# Patient Record
Sex: MALE | Race: WHITE | NOT HISPANIC OR LATINO | Employment: FULL TIME | URBAN - METROPOLITAN AREA
[De-identification: names, ages, dates, MRNs, and addresses within clinical notes are randomized per-mention and may not be internally consistent; named-entity substitution may affect disease eponyms.]

---

## 2023-06-20 ENCOUNTER — TRANSFERRED RECORDS (OUTPATIENT)
Dept: HEALTH INFORMATION MANAGEMENT | Facility: CLINIC | Age: 48
End: 2023-06-20
Payer: COMMERCIAL

## 2023-06-21 ENCOUNTER — APPOINTMENT (OUTPATIENT)
Dept: ULTRASOUND IMAGING | Facility: CLINIC | Age: 48
End: 2023-06-21
Attending: EMERGENCY MEDICINE
Payer: COMMERCIAL

## 2023-06-21 ENCOUNTER — HOSPITAL ENCOUNTER (INPATIENT)
Facility: CLINIC | Age: 48
LOS: 8 days | Discharge: OTHER INSTITUTION | End: 2023-06-29
Attending: EMERGENCY MEDICINE | Admitting: HOSPITALIST
Payer: COMMERCIAL

## 2023-06-21 ENCOUNTER — APPOINTMENT (OUTPATIENT)
Dept: CT IMAGING | Facility: CLINIC | Age: 48
End: 2023-06-21
Attending: EMERGENCY MEDICINE
Payer: COMMERCIAL

## 2023-06-21 DIAGNOSIS — F10.932 ALCOHOL WITHDRAWAL SYNDROME WITH PERCEPTUAL DISTURBANCE (H): ICD-10-CM

## 2023-06-21 DIAGNOSIS — E87.6 HYPOPOTASSEMIA: ICD-10-CM

## 2023-06-21 DIAGNOSIS — F10.232 ALCOHOL DEPENDENCE WITH WITHDRAWAL WITH PERCEPTUAL DISTURBANCE (H): ICD-10-CM

## 2023-06-21 DIAGNOSIS — K70.10 ALCOHOLIC HEPATITIS WITHOUT ASCITES (H): Primary | ICD-10-CM

## 2023-06-21 DIAGNOSIS — R53.1 ASTHENIA: ICD-10-CM

## 2023-06-21 DIAGNOSIS — E83.42 HYPOMAGNESEMIA: ICD-10-CM

## 2023-06-21 DIAGNOSIS — E87.6 HYPOKALEMIA: ICD-10-CM

## 2023-06-21 DIAGNOSIS — R53.1 GENERALIZED WEAKNESS: ICD-10-CM

## 2023-06-21 PROBLEM — K76.9 LIVER DISEASE: Status: ACTIVE | Noted: 2023-06-21

## 2023-06-21 PROBLEM — D69.6 THROMBOCYTOPENIA (H): Status: ACTIVE | Noted: 2023-06-21

## 2023-06-21 LAB
ALBUMIN SERPL BCG-MCNC: 3.7 G/DL (ref 3.5–5.2)
ALBUMIN UR-MCNC: NEGATIVE MG/DL
ALP SERPL-CCNC: 210 U/L (ref 40–129)
ALT SERPL W P-5'-P-CCNC: 44 U/L (ref 0–70)
ANION GAP SERPL CALCULATED.3IONS-SCNC: 15 MMOL/L (ref 7–15)
APAP SERPL-MCNC: <5 UG/ML (ref 10–30)
APPEARANCE UR: CLEAR
AST SERPL W P-5'-P-CCNC: 176 U/L (ref 0–45)
BASOPHILS # BLD AUTO: 0 10E3/UL (ref 0–0.2)
BASOPHILS NFR BLD AUTO: 0 %
BILIRUB SERPL-MCNC: 7.2 MG/DL
BILIRUB UR QL STRIP: NEGATIVE
BUN SERPL-MCNC: 7.2 MG/DL (ref 6–20)
CALCIUM SERPL-MCNC: 9 MG/DL (ref 8.6–10)
CHLORIDE SERPL-SCNC: 89 MMOL/L (ref 98–107)
COLOR UR AUTO: YELLOW
CREAT SERPL-MCNC: 0.59 MG/DL (ref 0.67–1.17)
DEPRECATED HCO3 PLAS-SCNC: 27 MMOL/L (ref 22–29)
EOSINOPHIL # BLD AUTO: 0 10E3/UL (ref 0–0.7)
EOSINOPHIL NFR BLD AUTO: 0 %
ERYTHROCYTE [DISTWIDTH] IN BLOOD BY AUTOMATED COUNT: 12.5 % (ref 10–15)
ETHANOL SERPL-MCNC: <0.01 G/DL
GFR SERPL CREATININE-BSD FRML MDRD: >90 ML/MIN/1.73M2
GGT SERPL-CCNC: 2961 U/L (ref 8–61)
GLUCOSE SERPL-MCNC: 118 MG/DL (ref 70–99)
GLUCOSE UR STRIP-MCNC: >499 MG/DL
HCT VFR BLD AUTO: 31.4 % (ref 40–53)
HGB BLD-MCNC: 11.2 G/DL (ref 13.3–17.7)
HGB UR QL STRIP: ABNORMAL
IMM GRANULOCYTES # BLD: 0.1 10E3/UL
IMM GRANULOCYTES NFR BLD: 1 %
INR PPP: 1.52 (ref 0.85–1.15)
IRON BINDING CAPACITY (ROCHE): 141 UG/DL (ref 240–430)
IRON SATN MFR SERPL: 38 % (ref 15–46)
IRON SERPL-MCNC: 54 UG/DL (ref 61–157)
KETONES UR STRIP-MCNC: NEGATIVE MG/DL
LEUKOCYTE ESTERASE UR QL STRIP: NEGATIVE
LIPASE SERPL-CCNC: 67 U/L (ref 13–60)
LYMPHOCYTES # BLD AUTO: 0.7 10E3/UL (ref 0.8–5.3)
LYMPHOCYTES NFR BLD AUTO: 7 %
MAGNESIUM SERPL-MCNC: 1.4 MG/DL (ref 1.7–2.3)
MAGNESIUM SERPL-MCNC: 2.2 MG/DL (ref 1.7–2.3)
MCH RBC QN AUTO: 32.9 PG (ref 26.5–33)
MCHC RBC AUTO-ENTMCNC: 35.7 G/DL (ref 31.5–36.5)
MCV RBC AUTO: 92 FL (ref 78–100)
MONOCYTES # BLD AUTO: 1.2 10E3/UL (ref 0–1.3)
MONOCYTES NFR BLD AUTO: 12 %
NEUTROPHILS # BLD AUTO: 8.6 10E3/UL (ref 1.6–8.3)
NEUTROPHILS NFR BLD AUTO: 80 %
NITRATE UR QL: NEGATIVE
NRBC # BLD AUTO: 0 10E3/UL
NRBC BLD AUTO-RTO: 0 /100
PH UR STRIP: 7 [PH] (ref 5–7)
PHOSPHATE SERPL-MCNC: 1.3 MG/DL (ref 2.5–4.5)
PLATELET # BLD AUTO: 96 10E3/UL (ref 150–450)
POTASSIUM SERPL-SCNC: 2.6 MMOL/L (ref 3.4–5.3)
POTASSIUM SERPL-SCNC: 2.7 MMOL/L (ref 3.4–5.3)
PROT SERPL-MCNC: 6.1 G/DL (ref 6.4–8.3)
RBC # BLD AUTO: 3.4 10E6/UL (ref 4.4–5.9)
RBC URINE: <1 /HPF
SODIUM SERPL-SCNC: 131 MMOL/L (ref 136–145)
SP GR UR STRIP: 1 (ref 1–1.03)
TRANSFERRIN SERPL-MCNC: 108 MG/DL (ref 200–360)
TSH SERPL DL<=0.005 MIU/L-ACNC: 3.13 UIU/ML (ref 0.3–4.2)
UROBILINOGEN UR STRIP-MCNC: NORMAL MG/DL
WBC # BLD AUTO: 10.6 10E3/UL (ref 4–11)
WBC URINE: <1 /HPF

## 2023-06-21 PROCEDURE — 84425 ASSAY OF VITAMIN B-1: CPT | Performed by: EMERGENCY MEDICINE

## 2023-06-21 PROCEDURE — 36415 COLL VENOUS BLD VENIPUNCTURE: CPT | Performed by: EMERGENCY MEDICINE

## 2023-06-21 PROCEDURE — 93010 ELECTROCARDIOGRAM REPORT: CPT | Performed by: EMERGENCY MEDICINE

## 2023-06-21 PROCEDURE — 80074 ACUTE HEPATITIS PANEL: CPT | Performed by: EMERGENCY MEDICINE

## 2023-06-21 PROCEDURE — 86015 ACTIN ANTIBODY EACH: CPT | Performed by: EMERGENCY MEDICINE

## 2023-06-21 PROCEDURE — 250N000011 HC RX IP 250 OP 636: Mod: JZ | Performed by: HOSPITALIST

## 2023-06-21 PROCEDURE — 120N000001 HC R&B MED SURG/OB

## 2023-06-21 PROCEDURE — 258N000003 HC RX IP 258 OP 636: Performed by: HOSPITALIST

## 2023-06-21 PROCEDURE — 93005 ELECTROCARDIOGRAM TRACING: CPT | Performed by: EMERGENCY MEDICINE

## 2023-06-21 PROCEDURE — 258N000003 HC RX IP 258 OP 636: Performed by: PHYSICIAN ASSISTANT

## 2023-06-21 PROCEDURE — 86706 HEP B SURFACE ANTIBODY: CPT | Performed by: EMERGENCY MEDICINE

## 2023-06-21 PROCEDURE — 83735 ASSAY OF MAGNESIUM: CPT | Performed by: EMERGENCY MEDICINE

## 2023-06-21 PROCEDURE — 81001 URINALYSIS AUTO W/SCOPE: CPT | Performed by: EMERGENCY MEDICINE

## 2023-06-21 PROCEDURE — 85025 COMPLETE CBC W/AUTO DIFF WBC: CPT | Performed by: EMERGENCY MEDICINE

## 2023-06-21 PROCEDURE — 80143 DRUG ASSAY ACETAMINOPHEN: CPT | Performed by: EMERGENCY MEDICINE

## 2023-06-21 PROCEDURE — 250N000013 HC RX MED GY IP 250 OP 250 PS 637: Performed by: PHYSICIAN ASSISTANT

## 2023-06-21 PROCEDURE — 250N000011 HC RX IP 250 OP 636: Performed by: PHYSICIAN ASSISTANT

## 2023-06-21 PROCEDURE — 250N000011 HC RX IP 250 OP 636: Performed by: EMERGENCY MEDICINE

## 2023-06-21 PROCEDURE — HZ2ZZZZ DETOXIFICATION SERVICES FOR SUBSTANCE ABUSE TREATMENT: ICD-10-PCS | Performed by: INTERNAL MEDICINE

## 2023-06-21 PROCEDURE — 82077 ASSAY SPEC XCP UR&BREATH IA: CPT | Performed by: EMERGENCY MEDICINE

## 2023-06-21 PROCEDURE — 99285 EMERGENCY DEPT VISIT HI MDM: CPT | Mod: 25 | Performed by: EMERGENCY MEDICINE

## 2023-06-21 PROCEDURE — 84443 ASSAY THYROID STIM HORMONE: CPT | Performed by: EMERGENCY MEDICINE

## 2023-06-21 PROCEDURE — 86038 ANTINUCLEAR ANTIBODIES: CPT | Performed by: EMERGENCY MEDICINE

## 2023-06-21 PROCEDURE — 84466 ASSAY OF TRANSFERRIN: CPT | Performed by: EMERGENCY MEDICINE

## 2023-06-21 PROCEDURE — 250N000013 HC RX MED GY IP 250 OP 250 PS 637: Performed by: HOSPITALIST

## 2023-06-21 PROCEDURE — 99223 1ST HOSP IP/OBS HIGH 75: CPT | Mod: AI | Performed by: PHYSICIAN ASSISTANT

## 2023-06-21 PROCEDURE — 250N000009 HC RX 250: Performed by: HOSPITALIST

## 2023-06-21 PROCEDURE — 83550 IRON BINDING TEST: CPT | Performed by: EMERGENCY MEDICINE

## 2023-06-21 PROCEDURE — 84100 ASSAY OF PHOSPHORUS: CPT | Performed by: HOSPITALIST

## 2023-06-21 PROCEDURE — 76705 ECHO EXAM OF ABDOMEN: CPT

## 2023-06-21 PROCEDURE — 85610 PROTHROMBIN TIME: CPT | Performed by: EMERGENCY MEDICINE

## 2023-06-21 PROCEDURE — 84132 ASSAY OF SERUM POTASSIUM: CPT | Performed by: PHYSICIAN ASSISTANT

## 2023-06-21 PROCEDURE — 258N000003 HC RX IP 258 OP 636: Performed by: EMERGENCY MEDICINE

## 2023-06-21 PROCEDURE — 82310 ASSAY OF CALCIUM: CPT | Performed by: EMERGENCY MEDICINE

## 2023-06-21 PROCEDURE — 83735 ASSAY OF MAGNESIUM: CPT | Mod: 91 | Performed by: PHYSICIAN ASSISTANT

## 2023-06-21 PROCEDURE — 82977 ASSAY OF GGT: CPT | Performed by: EMERGENCY MEDICINE

## 2023-06-21 PROCEDURE — 83690 ASSAY OF LIPASE: CPT | Performed by: EMERGENCY MEDICINE

## 2023-06-21 PROCEDURE — 70450 CT HEAD/BRAIN W/O DYE: CPT

## 2023-06-21 PROCEDURE — 36415 COLL VENOUS BLD VENIPUNCTURE: CPT | Performed by: PHYSICIAN ASSISTANT

## 2023-06-21 RX ORDER — GABAPENTIN 300 MG/1
900 CAPSULE ORAL EVERY 8 HOURS
Status: COMPLETED | OUTPATIENT
Start: 2023-06-21 | End: 2023-06-24

## 2023-06-21 RX ORDER — POTASSIUM CHLORIDE 7.45 MG/ML
10 INJECTION INTRAVENOUS ONCE
Status: COMPLETED | OUTPATIENT
Start: 2023-06-21 | End: 2023-06-21

## 2023-06-21 RX ORDER — DIAZEPAM 10 MG/2ML
5-10 INJECTION, SOLUTION INTRAMUSCULAR; INTRAVENOUS EVERY 30 MIN PRN
Status: DISCONTINUED | OUTPATIENT
Start: 2023-06-21 | End: 2023-06-25

## 2023-06-21 RX ORDER — GABAPENTIN 100 MG/1
100 CAPSULE ORAL EVERY 8 HOURS
Status: DISCONTINUED | OUTPATIENT
Start: 2023-06-28 | End: 2023-06-25

## 2023-06-21 RX ORDER — AMOXICILLIN 250 MG
1 CAPSULE ORAL 2 TIMES DAILY PRN
Status: DISCONTINUED | OUTPATIENT
Start: 2023-06-21 | End: 2023-06-29 | Stop reason: HOSPADM

## 2023-06-21 RX ORDER — MULTIPLE VITAMINS W/ MINERALS TAB 9MG-400MCG
1 TAB ORAL DAILY
Status: DISCONTINUED | OUTPATIENT
Start: 2023-06-21 | End: 2023-06-29 | Stop reason: HOSPADM

## 2023-06-21 RX ORDER — ONDANSETRON 4 MG/1
4 TABLET, ORALLY DISINTEGRATING ORAL EVERY 6 HOURS PRN
Status: DISCONTINUED | OUTPATIENT
Start: 2023-06-21 | End: 2023-06-29 | Stop reason: HOSPADM

## 2023-06-21 RX ORDER — POTASSIUM CHLORIDE 1.5 G/1.58G
40 POWDER, FOR SOLUTION ORAL ONCE
Status: COMPLETED | OUTPATIENT
Start: 2023-06-21 | End: 2023-06-21

## 2023-06-21 RX ORDER — DEXTROSE, SODIUM CHLORIDE, SODIUM LACTATE, POTASSIUM CHLORIDE, AND CALCIUM CHLORIDE 5; .6; .31; .03; .02 G/100ML; G/100ML; G/100ML; G/100ML; G/100ML
1000 INJECTION, SOLUTION INTRAVENOUS ONCE
Status: COMPLETED | OUTPATIENT
Start: 2023-06-21 | End: 2023-06-21

## 2023-06-21 RX ORDER — ACETAMINOPHEN 325 MG/1
650 TABLET ORAL EVERY 6 HOURS PRN
Status: DISCONTINUED | OUTPATIENT
Start: 2023-06-21 | End: 2023-06-29 | Stop reason: HOSPADM

## 2023-06-21 RX ORDER — DIAZEPAM 5 MG
10 TABLET ORAL EVERY 30 MIN PRN
Status: DISCONTINUED | OUTPATIENT
Start: 2023-06-21 | End: 2023-06-25

## 2023-06-21 RX ORDER — AMOXICILLIN 250 MG
2 CAPSULE ORAL 2 TIMES DAILY PRN
Status: DISCONTINUED | OUTPATIENT
Start: 2023-06-21 | End: 2023-06-29 | Stop reason: HOSPADM

## 2023-06-21 RX ORDER — CLONIDINE HYDROCHLORIDE 0.1 MG/1
0.1 TABLET ORAL EVERY 8 HOURS
Status: DISCONTINUED | OUTPATIENT
Start: 2023-06-21 | End: 2023-06-22

## 2023-06-21 RX ORDER — PROCHLORPERAZINE MALEATE 5 MG
10 TABLET ORAL EVERY 6 HOURS PRN
Status: DISCONTINUED | OUTPATIENT
Start: 2023-06-21 | End: 2023-06-29 | Stop reason: HOSPADM

## 2023-06-21 RX ORDER — HALOPERIDOL 5 MG/ML
2.5-5 INJECTION INTRAMUSCULAR EVERY 6 HOURS PRN
Status: DISCONTINUED | OUTPATIENT
Start: 2023-06-21 | End: 2023-06-25

## 2023-06-21 RX ORDER — OLANZAPINE 5 MG/1
5-10 TABLET, ORALLY DISINTEGRATING ORAL EVERY 6 HOURS PRN
Status: DISCONTINUED | OUTPATIENT
Start: 2023-06-21 | End: 2023-06-25

## 2023-06-21 RX ORDER — GABAPENTIN 300 MG/1
600 CAPSULE ORAL EVERY 8 HOURS
Status: DISCONTINUED | OUTPATIENT
Start: 2023-06-24 | End: 2023-06-25

## 2023-06-21 RX ORDER — FOLIC ACID 1 MG/1
1 TABLET ORAL DAILY
Status: DISCONTINUED | OUTPATIENT
Start: 2023-06-21 | End: 2023-06-29 | Stop reason: HOSPADM

## 2023-06-21 RX ORDER — IBUPROFEN 200 MG
200 TABLET ORAL EVERY 4 HOURS PRN
Status: ON HOLD | COMMUNITY
End: 2023-06-29

## 2023-06-21 RX ORDER — GABAPENTIN 300 MG/1
300 CAPSULE ORAL EVERY 8 HOURS
Status: DISCONTINUED | OUTPATIENT
Start: 2023-06-26 | End: 2023-06-25

## 2023-06-21 RX ORDER — GABAPENTIN 600 MG/1
1200 TABLET ORAL ONCE
Status: COMPLETED | OUTPATIENT
Start: 2023-06-21 | End: 2023-06-21

## 2023-06-21 RX ORDER — POTASSIUM CHLORIDE 7.45 MG/ML
10 INJECTION INTRAVENOUS
Status: COMPLETED | OUTPATIENT
Start: 2023-06-21 | End: 2023-06-21

## 2023-06-21 RX ORDER — LOPERAMIDE HYDROCHLORIDE 2 MG/1
2 TABLET ORAL 4 TIMES DAILY PRN
COMMUNITY

## 2023-06-21 RX ORDER — LORAZEPAM 2 MG/1
2 TABLET ORAL
COMMUNITY

## 2023-06-21 RX ORDER — MAGNESIUM SULFATE HEPTAHYDRATE 40 MG/ML
4 INJECTION, SOLUTION INTRAVENOUS ONCE
Status: COMPLETED | OUTPATIENT
Start: 2023-06-21 | End: 2023-06-21

## 2023-06-21 RX ORDER — DIAZEPAM 10 MG/2ML
10 INJECTION, SOLUTION INTRAMUSCULAR; INTRAVENOUS ONCE
Status: COMPLETED | OUTPATIENT
Start: 2023-06-21 | End: 2023-06-21

## 2023-06-21 RX ORDER — POTASSIUM CHLORIDE 1.5 G/1.58G
20 POWDER, FOR SOLUTION ORAL ONCE
Status: COMPLETED | OUTPATIENT
Start: 2023-06-22 | End: 2023-06-22

## 2023-06-21 RX ORDER — PROCHLORPERAZINE 25 MG
25 SUPPOSITORY, RECTAL RECTAL EVERY 12 HOURS PRN
Status: DISCONTINUED | OUTPATIENT
Start: 2023-06-21 | End: 2023-06-29 | Stop reason: HOSPADM

## 2023-06-21 RX ORDER — FLUMAZENIL 0.1 MG/ML
0.2 INJECTION, SOLUTION INTRAVENOUS
Status: DISCONTINUED | OUTPATIENT
Start: 2023-06-21 | End: 2023-06-29 | Stop reason: HOSPADM

## 2023-06-21 RX ORDER — LIDOCAINE 40 MG/G
CREAM TOPICAL
Status: DISCONTINUED | OUTPATIENT
Start: 2023-06-21 | End: 2023-06-29 | Stop reason: HOSPADM

## 2023-06-21 RX ORDER — ONDANSETRON 2 MG/ML
4 INJECTION INTRAMUSCULAR; INTRAVENOUS EVERY 6 HOURS PRN
Status: DISCONTINUED | OUTPATIENT
Start: 2023-06-21 | End: 2023-06-29 | Stop reason: HOSPADM

## 2023-06-21 RX ADMIN — SODIUM PHOSPHATE, MONOBASIC, MONOHYDRATE AND SODIUM PHOSPHATE, DIBASIC, ANHYDROUS 15 MMOL: 142; 276 INJECTION, SOLUTION INTRAVENOUS at 16:24

## 2023-06-21 RX ADMIN — FOLIC ACID 1 MG: 1 TABLET ORAL at 15:42

## 2023-06-21 RX ADMIN — THIAMINE HYDROCHLORIDE 500 MG: 100 INJECTION, SOLUTION INTRAMUSCULAR; INTRAVENOUS at 12:30

## 2023-06-21 RX ADMIN — POTASSIUM CHLORIDE 10 MEQ: 7.46 INJECTION, SOLUTION INTRAVENOUS at 17:28

## 2023-06-21 RX ADMIN — GABAPENTIN 900 MG: 300 CAPSULE ORAL at 22:53

## 2023-06-21 RX ADMIN — CLONIDINE HYDROCHLORIDE 0.1 MG: 0.1 TABLET ORAL at 22:54

## 2023-06-21 RX ADMIN — CLONIDINE HYDROCHLORIDE 0.1 MG: 0.1 TABLET ORAL at 15:42

## 2023-06-21 RX ADMIN — POTASSIUM CHLORIDE 10 MEQ: 7.46 INJECTION, SOLUTION INTRAVENOUS at 13:39

## 2023-06-21 RX ADMIN — THIAMINE HYDROCHLORIDE 500 MG: 100 INJECTION, SOLUTION INTRAMUSCULAR; INTRAVENOUS at 19:44

## 2023-06-21 RX ADMIN — SODIUM CHLORIDE, SODIUM LACTATE, POTASSIUM CHLORIDE, CALCIUM CHLORIDE AND DEXTROSE MONOHYDRATE 1000 ML: 5; 600; 310; 30; 20 INJECTION, SOLUTION INTRAVENOUS at 10:58

## 2023-06-21 RX ADMIN — MULTIPLE VITAMINS W/ MINERALS TAB 1 TABLET: TAB at 15:42

## 2023-06-21 RX ADMIN — POTASSIUM CHLORIDE 40 MEQ: 1.5 FOR SOLUTION ORAL at 22:53

## 2023-06-21 RX ADMIN — POTASSIUM CHLORIDE 10 MEQ: 7.46 INJECTION, SOLUTION INTRAVENOUS at 14:30

## 2023-06-21 RX ADMIN — POTASSIUM CHLORIDE 10 MEQ: 7.46 INJECTION, SOLUTION INTRAVENOUS at 18:25

## 2023-06-21 RX ADMIN — DIAZEPAM 10 MG: 5 INJECTION, SOLUTION INTRAMUSCULAR; INTRAVENOUS at 10:56

## 2023-06-21 RX ADMIN — POTASSIUM CHLORIDE 10 MEQ: 7.46 INJECTION, SOLUTION INTRAVENOUS at 16:28

## 2023-06-21 RX ADMIN — GABAPENTIN 1200 MG: 600 TABLET, FILM COATED ORAL at 15:42

## 2023-06-21 RX ADMIN — POTASSIUM CHLORIDE 10 MEQ: 7.46 INJECTION, SOLUTION INTRAVENOUS at 15:32

## 2023-06-21 RX ADMIN — MAGNESIUM SULFATE HEPTAHYDRATE 4 G: 40 INJECTION, SOLUTION INTRAVENOUS at 13:36

## 2023-06-21 ASSESSMENT — ACTIVITIES OF DAILY LIVING (ADL)
DOING_ERRANDS_INDEPENDENTLY_DIFFICULTY: NO
CONCENTRATING,_REMEMBERING_OR_MAKING_DECISIONS_DIFFICULTY: YES
VISION_MANAGEMENT: GLASSES
TOILETING_ISSUES: NO;YES
ADLS_ACUITY_SCORE: 31
WEAR_GLASSES_OR_BLIND: YES
ADLS_ACUITY_SCORE: 37
TOILETING_ASSISTANCE: TOILETING DIFFICULTY, ASSISTANCE 1 PERSON
ADLS_ACUITY_SCORE: 35
ADLS_ACUITY_SCORE: 37
WALKING_OR_CLIMBING_STAIRS_DIFFICULTY: OTHER (SEE COMMENTS)
DIFFICULTY_EATING/SWALLOWING: NO
TOILETING: 1-->ASSISTANCE (EQUIPMENT/PERSON) NEEDED
DRESSING/BATHING_DIFFICULTY: NO
TOILETING: 0-->NOT TOILET TRAINED OR ASSISTANCE NEEDED (DEVELOPMENTALLY APPROPRIATE)
CHANGE_IN_FUNCTIONAL_STATUS_SINCE_ONSET_OF_CURRENT_ILLNESS/INJURY: YES
FALL_HISTORY_WITHIN_LAST_SIX_MONTHS: YES
ADLS_ACUITY_SCORE: 35

## 2023-06-21 NOTE — ED TRIAGE NOTES
"Pt arrives vis [facility] from [facility]. Pt there for ETOH treatment, arrived there yesterday. Pt states last ETOH was Sunday. Pt being given Ativan \"I'm more twitchy with that I think\". Pt not able to move himself around very well. Pt fell yesterday, bruising noted to right upper arm and right side of torso.      Triage Assessment     Row Name 06/21/23 1030       Triage Assessment (Adult)    Airway WDL WDL       Respiratory WDL    Respiratory WDL WDL       Skin Circulation/Temperature WDL    Skin Circulation/Temperature WDL WDL       Cardiac WDL    Cardiac WDL WDL       Peripheral/Neurovascular WDL    Peripheral Neurovascular WDL WDL       Cognitive/Neuro/Behavioral WDL    Cognitive/Neuro/Behavioral WDL WDL              "

## 2023-06-21 NOTE — ED PROVIDER NOTES
History     Chief Complaint   Patient presents with     Alcohol Problem     Withdrawal     HPI  Guillermo Oden is a 48 year old male with a history of alcohol abuse and addiction who presents for weakness and unsteady gait.  I spoke on the phone with the physician at [facility] regarding this patient.  He arrived for treatment of his alcohol use disorder last evening but since that time the staff there have noticed he is jaundiced and has been significantly weak, having difficulty walking around, could not even get up and use the walker to get to the toilet.  Was incontinent of urine in his bed.  They are very concerned and wanted him to be evaluated.    The patient says that he has been a longstanding drinker, drinks alcohol daily, heavy amounts.  It has been increasing recently.  He has been feeling weak and rundown, having trouble walking.  He feels anxious and jittery.  He says he has been falling.  Denies headache, fever, chest pain, shortness of breath, abdominal pain, vomiting.    Allergies:  No Known Allergies    Problem List:    Patient Active Problem List    Diagnosis Date Noted     Hypokalemia 06/21/2023     Priority: Medium     Hypomagnesemia 06/21/2023     Priority: Medium     Generalized weakness 06/21/2023     Priority: Medium     Alcohol withdrawal syndrome with perceptual disturbance (H) 06/21/2023     Priority: Medium        Past Medical History:    No past medical history on file.    Past Surgical History:    No past surgical history on file.    Family History:    No family history on file.    Social History:  Marital Status:          Medications:    No current outpatient medications on file.        Review of Systems    Physical Exam   BP: 123/86  Pulse: 106  Temp: 98.4  F (36.9  C)  Resp: 18  Height: 182.9 cm (6')  Weight: 68 kg (150 lb)  SpO2: 97 %      Physical Exam  /85   Pulse 86   Temp 98.4  F (36.9  C) (Oral)   Resp 12   Ht 1.829 m (6')   Wt 68 kg (150 lb)   SpO2 96%   BMI 20.34  kg/m     GENERAL: Alert, cooperative, moderate distress, jaundiced, tremulous  HEAD: No scalp laceration, hematoma, or abrasion.  No tenderness.  EYES: Conjunctivae clear, EOM intact. PERLL, Pupils are 2 mm.  HEENT:  Normal external ears.  No nasal discharge or evidence of septal hematoma. No facial instability or asymmetry. No evidence of intraoral trauma.  Intact bite without malalignment.  NECK: Full range of motion  CHEST:  No crepitus or tenderness with AP or lateral compression  CARDIOVASCULAR : Nml rate, distal pulses are normal and symmetric  LUNGS: No respiratory distress.  GI: Soft, ND, NT.   PELVIS: No crepitus or tenderness with AP or lateral compression  BACK: No step-offs palpated, no tenderness to palpation of thoracic or lumbar spine.  EXTREMITIES: No obvious deformities, no tenderness to palpation of the bilateral upper extremities or bilateral lower extremities over the long bones or major joints  NEUROLOGICAL : GCS= 15.  Awake, alert, and oriented x 3.  Cranial nerves II-XII grossly intact. Normal muscle strength and tone, sensation to light touch grossly intact in all extremities.  No focal deficits.   SKIN : Normal color, no jaundice or rash.  Multiple areas of ecchymosis throughout the bilateral upper extremities      ED Course                 Procedures              EKG Interpretation:      Interpreted by Dagoberto Awad MD  Time reviewed: 1050  Symptoms at time of EKG: Weakness   Rhythm: sinus   Rate: 108  Axis: normal  Ectopy: none  Conduction: normal  ST Segments/ T Waves: T wave flattening  Q Waves: none  Comparison to prior: No old EKG available    Clinical Impression: non-specific EKG    Critical Care time:  none               Results for orders placed or performed during the hospital encounter of 06/21/23 (from the past 24 hour(s))   CBC with Platelets & Differential    Narrative    The following orders were created for panel order CBC with Platelets & Differential.  Procedure                                Abnormality         Status                     ---------                               -----------         ------                     CBC with platelets and d...[[id number]]  Abnormal            Final result                 Please view results for these tests on the individual orders.   Comprehensive metabolic panel   Result Value Ref Range    Sodium 131 (L) 136 - 145 mmol/L    Potassium 2.7 (L) 3.4 - 5.3 mmol/L    Chloride 89 (L) 98 - 107 mmol/L    Carbon Dioxide (CO2) 27 22 - 29 mmol/L    Anion Gap 15 7 - 15 mmol/L    Urea Nitrogen 7.2 6.0 - 20.0 mg/dL    Creatinine 0.59 (L) 0.67 - 1.17 mg/dL    Calcium 9.0 8.6 - 10.0 mg/dL    Glucose 118 (H) 70 - 99 mg/dL    Alkaline Phosphatase 210 (H) 40 - 129 U/L     (H) 0 - 45 U/L    ALT 44 0 - 70 U/L    Protein Total 6.1 (L) 6.4 - 8.3 g/dL    Albumin 3.7 3.5 - 5.2 g/dL    Bilirubin Total 7.2 (H) <=1.2 mg/dL    GFR Estimate >90 >60 mL/min/1.73m2   Lipase   Result Value Ref Range    Lipase 67 (H) 13 - 60 U/L   TSH with free T4 reflex   Result Value Ref Range    TSH 3.13 0.30 - 4.20 uIU/mL   Magnesium   Result Value Ref Range    Magnesium 1.4 (L) 1.7 - 2.3 mg/dL   Alcohol level blood   Result Value Ref Range    Alcohol ethyl <0.01 <=0.01 g/dL   CBC with platelets and differential   Result Value Ref Range    WBC Count 10.6 4.0 - 11.0 10e3/uL    RBC Count 3.40 (L) 4.40 - 5.90 10e6/uL    Hemoglobin 11.2 (L) 13.3 - 17.7 g/dL    Hematocrit 31.4 (L) 40.0 - 53.0 %    MCV 92 78 - 100 fL    MCH 32.9 26.5 - 33.0 pg    MCHC 35.7 31.5 - 36.5 g/dL    RDW 12.5 10.0 - 15.0 %    Platelet Count 96 (L) 150 - 450 10e3/uL    % Neutrophils 80 %    % Lymphocytes 7 %    % Monocytes 12 %    % Eosinophils 0 %    % Basophils 0 %    % Immature Granulocytes 1 %    NRBCs per 100 WBC 0 <1 /100    Absolute Neutrophils 8.6 (H) 1.6 - 8.3 10e3/uL    Absolute Lymphocytes 0.7 (L) 0.8 - 5.3 10e3/uL    Absolute Monocytes 1.2 0.0 - 1.3 10e3/uL    Absolute Eosinophils 0.0 0.0 - 0.7  10e3/uL    Absolute Basophils 0.0 0.0 - 0.2 10e3/uL    Absolute Immature Granulocytes 0.1 <=0.4 10e3/uL    Absolute NRBCs 0.0 10e3/uL   Iron & Iron Binding Capacity   Result Value Ref Range    Iron 54 (L) 61 - 157 ug/dL    Iron Binding Capacity 141 (L) 240 - 430 ug/dL    Iron Sat Index 38 15 - 46 %   Phosphorus   Result Value Ref Range    Phosphorus 1.3 (L) 2.5 - 4.5 mg/dL   CT Head w/o Contrast    Narrative    CT SCAN OF THE HEAD WITHOUT CONTRAST   6/21/2023 11:31 AM     HISTORY: Altered mental status, unsteady gait.    TECHNIQUE:  Axial images of the head and coronal reformations without  IV contrast material. Radiation dose for this scan was reduced using  automated exposure control, adjustment of the mA and/or kV according  to patient size, or iterative reconstruction technique.    COMPARISON: None.    FINDINGS: No acute intracranial hemorrhage. No mass effect or midline  shift. Ventricular size is within normal limits without evidence of  hydrocephalus. Probable arachnoid cyst in the anterior left middle  cranial fossa. Gray-white matter differentiation appears intact.    Mucosal thickening in the left maxillary sinus which has an air-fluid  layer. The bony calvarium and bones of the skull base appear intact.       Impression    IMPRESSION:     1. No evidence of acute intracranial hemorrhage, mass, or herniation.  2. Mucosal thickening in the left maxillary sinus which contains an  air-fluid layer. This could represent acute sinusitis in the  appropriate clinical setting.      KENISHA BRIAN MD         SYSTEM ID:  [id number]   Urine Macroscopic with reflex to Microscopic   Result Value Ref Range    Color Urine Yellow Colorless, Straw, Light Yellow, Yellow    Appearance Urine Clear Clear    Glucose Urine >499 (A) Negative mg/dL    Bilirubin Urine Negative Negative    Ketones Urine Negative Negative mg/dL    Specific Gravity Urine 1.000 (L) 1.003 - 1.035    Blood Urine Small (A) Negative    pH Urine 7.0 5.0 - 7.0     Protein Albumin Urine Negative Negative mg/dL    Urobilinogen Urine Normal Normal, 2.0 mg/dL    Nitrite Urine Negative Negative    Leukocyte Esterase Urine Negative Negative    RBC Urine <1 <=2 /HPF    WBC Urine <1 <=5 /HPF   Acetaminophen level   Result Value Ref Range    Acetaminophen <5.0 (L) 10.0 - 30.0 ug/mL   INR   Result Value Ref Range    INR 1.52 (H) 0.85 - 1.15   Abdomen US, limited (RUQ only)    Narrative    US ABDOMEN LIMITED 6/21/2023 12:58 PM    CLINICAL HISTORY: liver failure, liver failure, etoh abuse  TECHNIQUE: Limited abdominal ultrasound.    COMPARISON: None.    FINDINGS:    GALLBLADDER: There is a 2.1 x 5.4 cm lobulated solid structure in the  dependent portion of the gallbladder lumen. No posterior acoustic  shadowing. No shadowing gallstones. No wall thickening or edema.  Negative sonographic Davidson's sign.    BILE DUCTS: There is no biliary dilatation. The common duct measures 3  mm.    LIVER: Diffusely coarse in echotexture. Mildly enlarged. Normal  surface contour. No discrete focal lesion.    RIGHT KIDNEY: No hydronephrosis.    PANCREAS: The visualized portions of the pancreas are normal.    Trace ascites.      Impression    IMPRESSION:  1.  Mildly enlarged fatty liver.  2.  Presumed tumefactive sludge within the gallbladder lumen.  Recommend short-term ultrasound follow-up in 8-12 weeks. No  cholelithiasis.  3.  Trace ascites.    FLACA ORTIZ MD         SYSTEM ID:  [id number]       Medications   thiamine (B-1) 500 mg in sodium chloride 0.9 % 50 mL intermittent infusion (0 mg Intravenous Stopped 6/21/23 1300)     Followed by   thiamine (B-1) 250 mg in sodium chloride 0.9 % 50 mL intermittent infusion (has no administration in time range)     Followed by   thiamine (B-1) tablet 100 mg (has no administration in time range)   potassium chloride 10 mEq in 100 mL sterile water infusion (has no administration in time range)   magnesium sulfate 4 g in 100 mL sterile water intermittent  infusion (has no administration in time range)   dextrose 5% in lactated ringers infusion (0 mLs Intravenous Stopped 6/21/23 1228)   diazepam (VALIUM) injection 10 mg (10 mg Intravenous $Given 6/21/23 1056)       Assessments & Plan (with Medical Decision Making)   48-year-old male who presents from his living with altered mental status, weakness, unsteady gait.  He is tachycardic and tremulous.  He is given IV fluids as well as IV thiamine in case there is a component of Warnicke's encephalopathy causing symptoms.  EKG is sinus rhythm with tachycardia but no signs of ischemia.  He does have T wave flattening and his potassium is 2.7, likely contributing to these EKG changes.  His magnesium is also low again consistent with his longstanding alcohol use.  He is replaced with both potassium and magnesium.  Ultrasound the gallbladder and liver obtained, images interpreted independently, also read the radiology read, trace ascites and mildly enlarged fatty liver.  INR is mildly elevated at 1.52.  His LFTs are mildly elevated.  His ethanol level 0.  The patient has significant derangements of his electrolytes and is weak and is unsafe to discharge back to detox center.  He is given IV diazepam for withdrawal.  He will be admitted to the medicine service here for further care.  I discussed the case with the on-call hospitalist, Dr. Chandler.  We discussed future management of the patient and full admission status.    I have reviewed the nursing notes.    I have reviewed the findings, diagnosis, plan and need for follow up with the patient.           Medical Decision Making  The patient's presentation was of high complexity (a chronic illness severe exacerbation, progression, or side effect of treatment).    The patient's evaluation involved:  an assessment requiring an independent historian (see separate area of note for details)  ordering and/or review of 3+ test(s) in this encounter (see separate area of note for  details)  independent interpretation of testing performed by another health professional (see separate area of note for details)    The patient's management necessitated high risk (a parenteral controlled substance) and high risk (a decision regarding hospitalization).        New Prescriptions    No medications on file       Final diagnoses:   Alcohol withdrawal syndrome with perceptual disturbance (H)   Hypomagnesemia   Hypokalemia   Generalized weakness       6/21/2023   [facility] EMERGENCY DEPT     Dagoberto Awad MD  06/21/23 5713

## 2023-06-21 NOTE — ED NOTES
[facility]   Admission Handoff    The patient is Guillermo Oden, 48 year old who arrived in the ED by AMBULANCE from [facility] with a complaint of Alcohol Problem and Withdrawal  . The patient's current symptoms are new and during this time the symptoms have increased. In the ED, patient was diagnosed with   Final diagnoses:   Alcohol withdrawal syndrome with perceptual disturbance (H)   Hypomagnesemia   Hypokalemia   Generalized weakness         Needed?: No    Allergies:  No Known Allergies    Past Medical Hx: No past medical history on file.    Initial vitals were: BP: 123/86  Pulse: 106  Temp: 98.4  F (36.9  C)  Resp: 18  Height: 182.9 cm (6')  Weight: 68 kg (150 lb)  SpO2: 97 %   Recent vital Signs: /85   Pulse 86   Temp 98.4  F (36.9  C) (Oral)   Resp 12   Ht 1.829 m (6')   Wt 68 kg (150 lb)   SpO2 96%   BMI 20.34 kg/m      Elimination Status: Continent: wears briefs     Activity Level: SBA    Fall Status: Reason for falls risk:  Mobility, Reason for falls risk: Elimination, and Reason for falls risk: Cognition  nonskid shoes/slippers when out of bed, arm band in place, patient and family education, assistive device/personal items within reach, activity supervised, and room door open    Baseline Mental status: other pt slightly confused d/t ETOH withdrawal  Current Mental Status changes: other      Infection present or suspected this encounter: no  Sepsis suspected: No    Isolation type: none    Bariatric equipment needed?: No    In the ED these meds were given:   Medications   thiamine (B-1) 500 mg in sodium chloride 0.9 % 50 mL intermittent infusion (0 mg Intravenous Stopped 6/21/23 1300)     Followed by   thiamine (B-1) 250 mg in sodium chloride 0.9 % 50 mL intermittent infusion (has no administration in time range)     Followed by   thiamine (B-1) tablet 100 mg (has no administration in time range)   potassium chloride 10 mEq in 100 mL sterile water infusion  (has no administration in time range)   magnesium sulfate 4 g in 100 mL sterile water intermittent infusion (has no administration in time range)   dextrose 5% in lactated ringers infusion (0 mLs Intravenous Stopped 6/21/23 1228)   diazepam (VALIUM) injection 10 mg (10 mg Intravenous $Given 6/21/23 1056)       Drips running?  Yes    Home pump  No    Current LDAs: Peripheral IV: Site left AC; Gauge 18  IV push only, no IV fluids     Results:   Labs/Imaging  Ordered and Resulted from Time of ED Arrival Up to the Time of Departure from the ED  Results for orders placed or performed during the hospital encounter of 06/21/23 (from the past 24 hour(s))   CBC with Platelets & Differential    Narrative    The following orders were created for panel order CBC with Platelets & Differential.  Procedure                               Abnormality         Status                     ---------                               -----------         ------                     CBC with platelets and d...[[id number]]  Abnormal            Final result                 Please view results for these tests on the individual orders.   Comprehensive metabolic panel   Result Value Ref Range    Sodium 131 (L) 136 - 145 mmol/L    Potassium 2.7 (L) 3.4 - 5.3 mmol/L    Chloride 89 (L) 98 - 107 mmol/L    Carbon Dioxide (CO2) 27 22 - 29 mmol/L    Anion Gap 15 7 - 15 mmol/L    Urea Nitrogen 7.2 6.0 - 20.0 mg/dL    Creatinine 0.59 (L) 0.67 - 1.17 mg/dL    Calcium 9.0 8.6 - 10.0 mg/dL    Glucose 118 (H) 70 - 99 mg/dL    Alkaline Phosphatase 210 (H) 40 - 129 U/L     (H) 0 - 45 U/L    ALT 44 0 - 70 U/L    Protein Total 6.1 (L) 6.4 - 8.3 g/dL    Albumin 3.7 3.5 - 5.2 g/dL    Bilirubin Total 7.2 (H) <=1.2 mg/dL    GFR Estimate >90 >60 mL/min/1.73m2   Lipase   Result Value Ref Range    Lipase 67 (H) 13 - 60 U/L   TSH with free T4 reflex   Result Value Ref Range    TSH 3.13 0.30 - 4.20 uIU/mL   Magnesium   Result Value Ref Range    Magnesium 1.4 (L) 1.7 -  2.3 mg/dL   Alcohol level blood   Result Value Ref Range    Alcohol ethyl <0.01 <=0.01 g/dL   CBC with platelets and differential   Result Value Ref Range    WBC Count 10.6 4.0 - 11.0 10e3/uL    RBC Count 3.40 (L) 4.40 - 5.90 10e6/uL    Hemoglobin 11.2 (L) 13.3 - 17.7 g/dL    Hematocrit 31.4 (L) 40.0 - 53.0 %    MCV 92 78 - 100 fL    MCH 32.9 26.5 - 33.0 pg    MCHC 35.7 31.5 - 36.5 g/dL    RDW 12.5 10.0 - 15.0 %    Platelet Count 96 (L) 150 - 450 10e3/uL    % Neutrophils 80 %    % Lymphocytes 7 %    % Monocytes 12 %    % Eosinophils 0 %    % Basophils 0 %    % Immature Granulocytes 1 %    NRBCs per 100 WBC 0 <1 /100    Absolute Neutrophils 8.6 (H) 1.6 - 8.3 10e3/uL    Absolute Lymphocytes 0.7 (L) 0.8 - 5.3 10e3/uL    Absolute Monocytes 1.2 0.0 - 1.3 10e3/uL    Absolute Eosinophils 0.0 0.0 - 0.7 10e3/uL    Absolute Basophils 0.0 0.0 - 0.2 10e3/uL    Absolute Immature Granulocytes 0.1 <=0.4 10e3/uL    Absolute NRBCs 0.0 10e3/uL   Iron & Iron Binding Capacity   Result Value Ref Range    Iron 54 (L) 61 - 157 ug/dL    Iron Binding Capacity 141 (L) 240 - 430 ug/dL    Iron Sat Index 38 15 - 46 %   CT Head w/o Contrast    Narrative    CT SCAN OF THE HEAD WITHOUT CONTRAST   6/21/2023 11:31 AM     HISTORY: Altered mental status, unsteady gait.    TECHNIQUE:  Axial images of the head and coronal reformations without  IV contrast material. Radiation dose for this scan was reduced using  automated exposure control, adjustment of the mA and/or kV according  to patient size, or iterative reconstruction technique.    COMPARISON: None.    FINDINGS: No acute intracranial hemorrhage. No mass effect or midline  shift. Ventricular size is within normal limits without evidence of  hydrocephalus. Probable arachnoid cyst in the anterior left middle  cranial fossa. Gray-white matter differentiation appears intact.    Mucosal thickening in the left maxillary sinus which has an air-fluid  layer. The bony calvarium and bones of the skull base  appear intact.       Impression    IMPRESSION:     1. No evidence of acute intracranial hemorrhage, mass, or herniation.  2. Mucosal thickening in the left maxillary sinus which contains an  air-fluid layer. This could represent acute sinusitis in the  appropriate clinical setting.      KENISHA BRIAN MD         SYSTEM ID:  [id number]   Urine Macroscopic with reflex to Microscopic   Result Value Ref Range    Color Urine Yellow Colorless, Straw, Light Yellow, Yellow    Appearance Urine Clear Clear    Glucose Urine >499 (A) Negative mg/dL    Bilirubin Urine Negative Negative    Ketones Urine Negative Negative mg/dL    Specific Gravity Urine 1.000 (L) 1.003 - 1.035    Blood Urine Small (A) Negative    pH Urine 7.0 5.0 - 7.0    Protein Albumin Urine Negative Negative mg/dL    Urobilinogen Urine Normal Normal, 2.0 mg/dL    Nitrite Urine Negative Negative    Leukocyte Esterase Urine Negative Negative    RBC Urine <1 <=2 /HPF    WBC Urine <1 <=5 /HPF   Abdomen US, limited (RUQ only)    Narrative    US ABDOMEN LIMITED 6/21/2023 12:58 PM    CLINICAL HISTORY: liver failure, liver failure, etoh abuse  TECHNIQUE: Limited abdominal ultrasound.    COMPARISON: None.    FINDINGS:    GALLBLADDER: There is a 2.1 x 5.4 cm lobulated solid structure in the  dependent portion of the gallbladder lumen. No posterior acoustic  shadowing. No shadowing gallstones. No wall thickening or edema.  Negative sonographic Davidson's sign.    BILE DUCTS: There is no biliary dilatation. The common duct measures 3  mm.    LIVER: Diffusely coarse in echotexture. Mildly enlarged. Normal  surface contour. No discrete focal lesion.    RIGHT KIDNEY: No hydronephrosis.    PANCREAS: The visualized portions of the pancreas are normal.    Trace ascites.      Impression    IMPRESSION:  1.  Mildly enlarged fatty liver.  2.  Presumed tumefactive sludge within the gallbladder lumen.  Recommend short-term ultrasound follow-up in 8-12 weeks. No  cholelithiasis.  3.  Trace  ascites.    FLACA ORTIZ MD         SYSTEM ID:  [id number]       For the majority of the shift this patient's behavior was Green     Cardiac Rhythm: Tachycardia  Pt needs tele? Yes  Skin/wound Issues:  pt has bruised noted to both arms and right torso from recent falls.     Code Status: Full Code    Pain control: pt had none    Nausea control: pt had none    Abnormal labs/tests/findings requiring intervention: see chart    Patient tested for COVID 19 prior to admission: NO     OBS brochure/video discussed/provided to patient/family: N/A     Family present during ED course? No     Family Comments/Social Situation comments: OK to give mother, Cleopatra [phone], updates. Pt is from [facility], has no family in the area.     Tasks needing completion:  labs as ordered. K+ and Mg protocols and replacement doses to be completed, see MAR.     Maribeth Mckay RN

## 2023-06-21 NOTE — MEDICATION SCRIBE - ADMISSION MEDICATION HISTORY
Medication Scribe Admission Medication History    Admission medication history is complete. The information provided in this note is only as accurate as the sources available at the time of the update.    Medication reconciliation/reorder completed by provider prior to medication history? No    Information Source(s): Patient and Facility (TCU/NH/) medication list/MAR via phone    Pertinent Information: pt arrived at [facility] yesterday    Changes made to PTA medication list:    Added: advil, thiamine, ativan, magnesium    Deleted: None    Changed: None    Medication Affordability:  Not including over the counter (OTC) medications, was there a time in the past 3 months when you did not take your medications as prescribed because of cost?: Unable to Assess (pt is intoxicated)    Allergies reviewed with patient and updates made in EHR: yes    Medication History Completed By: Marisol Grayson 6/21/2023 6:38 PM    Prior to Admission medications    Medication Sig Last Dose Taking? Auth Provider Long Term End Date   ibuprofen (ADVIL/MOTRIN) 200 MG tablet Take 200 mg by mouth every 4 hours as needed for pain Past Week at unknown Yes Reported, Patient     loperamide (IMODIUM A-D) 2 MG tablet Take 2 mg by mouth 4 times daily as needed for diarrhea Unknown at unknown Yes Reported, Patient     LORazepam (ATIVAN) 2 MG tablet Take 2 mg by mouth Per [facility] 6/21/2023 at 0545 Yes Reported, Patient     magnesium chloride 535 (64 Mg) MG TBEC CR tablet Take 535 mg by mouth Per [facility] 6/20/2023 at 2100 Yes Reported, Patient     Thiamine HCl (THIAMINE PO) Take 1,000 mg by mouth Per [facility] 6/20/2023 at 2100 Yes Reported, Patient

## 2023-06-21 NOTE — PROGRESS NOTES
Reviewing chart due to high probability of admit to Med/Surg unit. Pt has not yet been accepted by Provider.  Victor Hugo Covarrubias RN on 6/21/2023 at 12:58 PM    Pt has been accepted by Dr. Chandler.  Victor Hugo Covarrubias RN on 6/21/2023 at 1:58 PM

## 2023-06-21 NOTE — PLAN OF CARE
Reason for Admission:  Alcohol withdrawal     Activity: patient has not been out of bed d/t unsteadiness and weakness but has been repositioning in the bed with 1 assist     Neuro: alert and oriented.  Currently on CIWA's last scored a 4 and next CIWA assessment is at 2000.      Cardiac: tachycardic    GI/: incontinent of urine at times but has urinal at bedside that he will use     Skin:  bruising all over right side of his body and abrasions/excoriations scattered all over his body     Diet: Regular diet     Lines/Drains: 2 Iv's in the left and right AC     Vitals: /78   Pulse 97   Temp 97.3  F (36.3  C) (Oral)   Resp 18   Ht 1.829 m (6')   Wt 64.9 kg (143 lb 1.3 oz)   SpO2 96%   BMI 19.40 kg/m      Labs: On Mag, Potassium and Phosphorous protocol.  Magnesium was 1.4 and was replaced and lab recheck is scheduled for 1900.  Potassium is currently being replaced and lab recheck is scheduled for 2200.  Phosphorous is currently being replaced and lab recheck is scheduled for 0000.     Pain: denying pain     Plan: will return back to [facility] when medically ready

## 2023-06-21 NOTE — H&P
"[facility]    History and Physical - Hospitalist Service       Date of Admission:  6/21/2023    Assessment & Plan      Guillermo Oden is a 48 year old male admitted on 6/21/2023. He presented to the emergency department from [facility] for evaluation of alcohol withdrawal for which he is being admitted for further evaluation and treatment.    Alcohol withdrawal syndrome with perceptual disturbance   Was sent to the emergency department from [facility] for alcohol withdrawal that was not well controlled there. Drinks about 850 mL vodka daily, last drink on 6/18/23. Has never been through treatment before but has withdrawn, no history of seizures.   - Admit on CIWA with prn Valium, scheduled clonidine, and scheduled gabapentin taper  - Care Transitions consult to help with discharge back to [facility]    Liver disease, likely due to alcohol   Presented with alcohol pattern LFTs (ALT 44, ), elevated total bilirubin (7.2), elevated INR (1.52)Hepatomegaly and scleral icterus noted on exam. Abdominal ultrasound demonstrates \"1.  Mildly enlarged fatty liver. 2.  Presumed tumefactive sludge within the gallbladder lumen. Recommend short-term ultrasound follow-up in 8-12 weeks. No cholelithiasis. 3.  Trace ascites.\" Presumably has alcoholic liver disease, although cannot exclude other etiologies.   - Trend CMP daily   - Work-up pending: viral hepatitis panel, Anti Nuclear antibody IGG, GGT, F actin EIA, vitamin V1, transferrin    Hypokalemia / Hypomagnesemia / hypophosphatemia   Admit K = 2.7 / Mg = 1.4 / Phos = 1.3. Likely due to poor intake other than alcohol, is at risk for refeeding syndrome.  - K / Mg / Phos replacement protocols    Thrombocytopenia   Admit platelets 96, likely due to chronic alcohol use.   - Monitor CBC occasionally    Normocytic anemia  Iron studies show low iron (54) and low IBC, (141), normal iron saturation index (38), but ferritin and " "transferrin pending. Possible anemia of chronic disease?  - Ferritin, transferrin pending    Generalized weakness  Patient weak and unsteady secondary to withdrawal, expect to improve as patient completes withdrawal process.  - May need PT / OT, will defer for now             Diet: Combination Diet Regular Diet Adult  DVT Prophylaxis: Pneumatic Compression Devices  Ferro Catheter: Not present  Lines: None     Cardiac Monitoring: None  Code Status: Full Code  - discussed at time of admission     Clinically Significant Risk Factors Present on Admission        # Hypokalemia: Lowest K = 2.7 mmol/L in last 2 days, will replace as needed     # Hypomagnesemia: Lowest Mg = 1.4 mg/dL in last 2 days, will replace as needed    # Coagulation Defect: INR = 1.52 (Ref range: 0.85 - 1.15) and/or PTT = N/A, will monitor for bleeding  # Thrombocytopenia: Lowest platelets = 96 in last 2 days, will monitor for bleeding                 Disposition Plan      Expected Discharge Date: 06/23/2023                The patient's care was discussed with the Attending Physician, Dr. Denver Saeed and Patient.    Deneen Guaman PA-C  Hospitalist Service  [facility]  Securely message with [employer] (more info)  Text page via [facility] Paging/Directory     ______________________________________________________________________    Chief Complaint   \"I'm withdrawing.\"    History is obtained from the patient, review of EMR (minimal information available), and emergency department sign out from Dr. Kirill Awad.    History of Present Illness   Guillermo Oden is a 48 year old male who presented to the emergency department from [facility] due to alcohol withdrawal.    Patient lives in [place] but came to [place] to enroll in treatment for alcohol dependence at [facility].   He has gone through withdrawal in the past, but has never attended treatment before.   He drinks about 850 ml vodka daily, " "last drink 4 days ago on 8/16.   He was being treatment for withdrawal at [facility], but patient remained extremely tremulous despite oral treatment there, so he was sent to the emergency department, where ongoing treatment for withdrawal was initiated.    Review of systems   Poor recent appetite, had COVID a while ago and ever since then he has lost his taste so nothing tastes good.  He has lost about 20 pounds since then (used to weigh around 165).   He has occasional diarrhea, nothing recently.   He feels weak and unsteady on his feet.  He has some bruises on his arms, says \"I was working on some things\" when asked how he got the bruises, but does not further elaborate.  The remainder review of systems is negative.       Past Medical History    No past medical history on file.    Past Surgical History   No past surgical history on file.    Prior to Admission Medications   None        Review of Systems    The 10 point Review of Systems is negative other than noted in the HPI or here.      Physical Exam   Vital Signs: Temp: 97.3  F (36.3  C) Temp src: Oral BP: 116/78 Pulse: 97   Resp: 18 SpO2: 96 % O2 Device: None (Room air)    Weight: 143 lbs 1.26 oz    Constitutional: Alert but responses to questions and commands are somewhat delayed. Oriented, cooperative, and appropriate. No apparent distress, appears nontoxic. Speaking in full coherent sentences, although slow and delayed speech.     Eyes: Eyes are clear, pupils are reactive. Scleral icterus present. Extraocular movements intact.     HEENT: Oropharynx is clear and moist, no lesions. Tongue fasciculations present. Normocephalic, no evidence of cranial trauma.        Cardiovascular: Regular rhythm and rate, normal S1 and S2. No murmur, rubs, or gallops. Peripheral pulses intact bilaterally. No lower extremity edema.    Respiratory: Lung sounds are clear to auscultation bilaterally without wheezes, rhonchi, or crackles.    GI: Firm but non-distended. Non-tender, " no rebound or guarding. Hepatosplenomegaly noted. Normal bowel sounds.     Musculoskeletal: Without obvious deformity, normal range of motion. Normal muscle bulk and tone. Distal CMS intact.      Skin: Warm and dry, no rashes. Scattered ecchymoses present on arms. No mottling of skin.      Neurologic: Patient moves all extremities. Gross strength and sensation are equal bilaterally. Is shaky and tremulous, tongue fasciculations present.     Genitourinary: Deferred      Medical Decision Making       60 MINUTES SPENT BY ME on the date of service doing chart review, history, exam, documentation & further activities per the note.  MANAGEMENT DISCUSSED with the following over the past 24 hours: Dr. Denver Saeed   Tests ORDERED & REVIEWED in the past 24 hours:  - CMP  - CBC  - LFTs  - Magnesium  - Phosphorus  Tests personally interpreted in the past 24 hours:  - EKG tracing showing sinus tachycardia      Data     I have personally reviewed the following data over the past 24 hrs:    10.6  \   11.2 (L)   / 96 (L)     131 (L) 89 (L) 7.2 /  118 (H)   2.7 (L) 27 0.59 (L) \       ALT: 44 AST: 176 (H) AP: 210 (H) TBILI: 7.2 (H)   ALB: 3.7 TOT PROTEIN: 6.1 (L) LIPASE: 67 (H)       TSH: 3.13 T4: N/A A1C: N/A       INR:  1.52 (H) PTT:  N/A   D-dimer:  N/A Fibrinogen:  N/A       Imaging results reviewed over the past 24 hrs:   Recent Results (from the past 24 hour(s))   CT Head w/o Contrast    Narrative    CT SCAN OF THE HEAD WITHOUT CONTRAST   6/21/2023 11:31 AM     HISTORY: Altered mental status, unsteady gait.    TECHNIQUE:  Axial images of the head and coronal reformations without  IV contrast material. Radiation dose for this scan was reduced using  automated exposure control, adjustment of the mA and/or kV according  to patient size, or iterative reconstruction technique.    COMPARISON: None.    FINDINGS: No acute intracranial hemorrhage. No mass effect or midline  shift. Ventricular size is within normal limits without  evidence of  hydrocephalus. Probable arachnoid cyst in the anterior left middle  cranial fossa. Gray-white matter differentiation appears intact.    Mucosal thickening in the left maxillary sinus which has an air-fluid  layer. The bony calvarium and bones of the skull base appear intact.       Impression    IMPRESSION:     1. No evidence of acute intracranial hemorrhage, mass, or herniation.  2. Mucosal thickening in the left maxillary sinus which contains an  air-fluid layer. This could represent acute sinusitis in the  appropriate clinical setting.      KENISHA BRIAN MD         SYSTEM ID:  [id number]   Abdomen US, limited (RUQ only)    Narrative    US ABDOMEN LIMITED 6/21/2023 12:58 PM    CLINICAL HISTORY: liver failure, liver failure, etoh abuse  TECHNIQUE: Limited abdominal ultrasound.    COMPARISON: None.    FINDINGS:    GALLBLADDER: There is a 2.1 x 5.4 cm lobulated solid structure in the  dependent portion of the gallbladder lumen. No posterior acoustic  shadowing. No shadowing gallstones. No wall thickening or edema.  Negative sonographic Davidson's sign.    BILE DUCTS: There is no biliary dilatation. The common duct measures 3  mm.    LIVER: Diffusely coarse in echotexture. Mildly enlarged. Normal  surface contour. No discrete focal lesion.    RIGHT KIDNEY: No hydronephrosis.    PANCREAS: The visualized portions of the pancreas are normal.    Trace ascites.      Impression    IMPRESSION:  1.  Mildly enlarged fatty liver.  2.  Presumed tumefactive sludge within the gallbladder lumen.  Recommend short-term ultrasound follow-up in 8-12 weeks. No  cholelithiasis.  3.  Trace ascites.    FLACA ORTIZ MD         SYSTEM ID:  [id number]

## 2023-06-21 NOTE — PROGRESS NOTES
[facility] ADMISSION NOTE    Patient admitted to room 2208 at approximately 1420 via cart from emergency room. Patient was accompanied by other:STAFF.     Verbal SBAR report received from BENNETT Garcia prior to patient arrival.     Patient trasferred to bed via air ana. Patient alert and oriented X 2. The patient is not having any pain.  . Admission vital signs: Blood pressure 116/78, pulse 97, temperature 97.3  F (36.3  C), temperature source Oral, resp. rate 18, height 1.829 m (6'), weight 64.9 kg (143 lb 1.3 oz), SpO2 96 %. Patient was oriented to plan of care, call light, bed controls, tv, telephone, bathroom and visiting hours.     Risk Assessment    The following safety risks were identified during admission: fall, skin and harm to self. Noted bruising and abrasions down the whole right side. Yellow risk band applied: YES.     Skin Initial Assessment    This writer admitted this patient and completed a full skin assessment and Robbie score in the Adult PCS flowsheet. Appropriate interventions initiated as needed.     Secondary skin check completed by BENNETT Williamson.         Education    Patient has a [place] to Observation order: No  Observation education completed and documented: Yes      Catherine Duncan RN

## 2023-06-21 NOTE — PROGRESS NOTES
Admitting nurse completed full assessment, Robbie Score and Robbie Interventions.  This writer agrees with the initial skin assessment findings.

## 2023-06-22 ENCOUNTER — APPOINTMENT (OUTPATIENT)
Dept: PHYSICAL THERAPY | Facility: CLINIC | Age: 48
End: 2023-06-22
Payer: COMMERCIAL

## 2023-06-22 PROBLEM — F10.232 ALCOHOL DEPENDENCE WITH WITHDRAWAL WITH PERCEPTUAL DISTURBANCE (H): Status: ACTIVE | Noted: 2023-06-22

## 2023-06-22 PROBLEM — R53.1 ASTHENIA: Status: ACTIVE | Noted: 2023-06-22

## 2023-06-22 PROBLEM — E87.6 HYPOPOTASSEMIA: Status: ACTIVE | Noted: 2023-06-22

## 2023-06-22 LAB
ALBUMIN SERPL BCG-MCNC: 3.2 G/DL (ref 3.5–5.2)
ALP SERPL-CCNC: 209 U/L (ref 40–129)
ALT SERPL W P-5'-P-CCNC: 46 U/L (ref 0–70)
ANA SER QL IF: NEGATIVE
ANION GAP SERPL CALCULATED.3IONS-SCNC: 11 MMOL/L (ref 7–15)
ANION GAP SERPL CALCULATED.3IONS-SCNC: 14 MMOL/L (ref 7–15)
AST SERPL W P-5'-P-CCNC: 170 U/L (ref 0–45)
BILIRUB SERPL-MCNC: 8.3 MG/DL
BUN SERPL-MCNC: 7.5 MG/DL (ref 6–20)
BUN SERPL-MCNC: 8.2 MG/DL (ref 6–20)
CALCIUM SERPL-MCNC: 8.6 MG/DL (ref 8.6–10)
CALCIUM SERPL-MCNC: 8.6 MG/DL (ref 8.6–10)
CHLORIDE SERPL-SCNC: 92 MMOL/L (ref 98–107)
CHLORIDE SERPL-SCNC: 95 MMOL/L (ref 98–107)
CREAT SERPL-MCNC: 0.56 MG/DL (ref 0.67–1.17)
CREAT SERPL-MCNC: 0.62 MG/DL (ref 0.67–1.17)
DEPRECATED HCO3 PLAS-SCNC: 25 MMOL/L (ref 22–29)
DEPRECATED HCO3 PLAS-SCNC: 26 MMOL/L (ref 22–29)
ERYTHROCYTE [DISTWIDTH] IN BLOOD BY AUTOMATED COUNT: 13 % (ref 10–15)
GFR SERPL CREATININE-BSD FRML MDRD: >90 ML/MIN/1.73M2
GFR SERPL CREATININE-BSD FRML MDRD: >90 ML/MIN/1.73M2
GLUCOSE SERPL-MCNC: 124 MG/DL (ref 70–99)
GLUCOSE SERPL-MCNC: 95 MG/DL (ref 70–99)
HAV IGM SERPL QL IA: NONREACTIVE
HBV CORE IGM SERPL QL IA: NONREACTIVE
HBV SURFACE AB SERPL IA-ACNC: 1.03 M[IU]/ML
HBV SURFACE AB SERPL IA-ACNC: NONREACTIVE M[IU]/ML
HBV SURFACE AG SERPL QL IA: NONREACTIVE
HCT VFR BLD AUTO: 34.5 % (ref 40–53)
HCV AB SERPL QL IA: NONREACTIVE
HGB BLD-MCNC: 11.8 G/DL (ref 13.3–17.7)
MAGNESIUM SERPL-MCNC: 2.1 MG/DL (ref 1.7–2.3)
MCH RBC QN AUTO: 33.1 PG (ref 26.5–33)
MCHC RBC AUTO-ENTMCNC: 34.2 G/DL (ref 31.5–36.5)
MCV RBC AUTO: 97 FL (ref 78–100)
PHOSPHATE SERPL-MCNC: 1.3 MG/DL (ref 2.5–4.5)
PHOSPHATE SERPL-MCNC: 1.4 MG/DL (ref 2.5–4.5)
PHOSPHATE SERPL-MCNC: 2 MG/DL (ref 2.5–4.5)
PHOSPHATE SERPL-MCNC: 3.4 MG/DL (ref 2.5–4.5)
PLATELET # BLD AUTO: 105 10E3/UL (ref 150–450)
POTASSIUM SERPL-SCNC: 3.4 MMOL/L (ref 3.4–5.3)
POTASSIUM SERPL-SCNC: 3.9 MMOL/L (ref 3.4–5.3)
PROT SERPL-MCNC: 5.7 G/DL (ref 6.4–8.3)
RBC # BLD AUTO: 3.57 10E6/UL (ref 4.4–5.9)
SMA IGG SER-ACNC: 8 UNITS
SODIUM SERPL-SCNC: 131 MMOL/L (ref 136–145)
SODIUM SERPL-SCNC: 132 MMOL/L (ref 136–145)
WBC # BLD AUTO: 10.7 10E3/UL (ref 4–11)

## 2023-06-22 PROCEDURE — 97161 PT EVAL LOW COMPLEX 20 MIN: CPT | Mod: GP | Performed by: PHYSICAL THERAPIST

## 2023-06-22 PROCEDURE — 99233 SBSQ HOSP IP/OBS HIGH 50: CPT | Performed by: HOSPITALIST

## 2023-06-22 PROCEDURE — 83735 ASSAY OF MAGNESIUM: CPT | Performed by: HOSPITALIST

## 2023-06-22 PROCEDURE — 258N000003 HC RX IP 258 OP 636: Performed by: HOSPITALIST

## 2023-06-22 PROCEDURE — 80053 COMPREHEN METABOLIC PANEL: CPT | Performed by: HOSPITALIST

## 2023-06-22 PROCEDURE — 250N000013 HC RX MED GY IP 250 OP 250 PS 637: Performed by: HOSPITALIST

## 2023-06-22 PROCEDURE — 36415 COLL VENOUS BLD VENIPUNCTURE: CPT | Performed by: HOSPITALIST

## 2023-06-22 PROCEDURE — 120N000001 HC R&B MED SURG/OB

## 2023-06-22 PROCEDURE — 87522 HEPATITIS C REVRS TRNSCRPJ: CPT | Performed by: PHYSICIAN ASSISTANT

## 2023-06-22 PROCEDURE — G0378 HOSPITAL OBSERVATION PER HR: HCPCS

## 2023-06-22 PROCEDURE — 258N000003 HC RX IP 258 OP 636: Performed by: PHYSICIAN ASSISTANT

## 2023-06-22 PROCEDURE — 250N000013 HC RX MED GY IP 250 OP 250 PS 637: Performed by: PHYSICIAN ASSISTANT

## 2023-06-22 PROCEDURE — 36415 COLL VENOUS BLD VENIPUNCTURE: CPT | Performed by: PHYSICIAN ASSISTANT

## 2023-06-22 PROCEDURE — 84100 ASSAY OF PHOSPHORUS: CPT | Performed by: HOSPITALIST

## 2023-06-22 PROCEDURE — 82435 ASSAY OF BLOOD CHLORIDE: CPT | Performed by: HOSPITALIST

## 2023-06-22 PROCEDURE — 97530 THERAPEUTIC ACTIVITIES: CPT | Mod: GP | Performed by: PHYSICAL THERAPIST

## 2023-06-22 PROCEDURE — 250N000009 HC RX 250: Performed by: HOSPITALIST

## 2023-06-22 PROCEDURE — 250N000011 HC RX IP 250 OP 636: Performed by: PHYSICIAN ASSISTANT

## 2023-06-22 PROCEDURE — 85027 COMPLETE CBC AUTOMATED: CPT | Performed by: PHYSICIAN ASSISTANT

## 2023-06-22 RX ORDER — THIAMINE HYDROCHLORIDE 100 MG/ML
100 INJECTION, SOLUTION INTRAMUSCULAR; INTRAVENOUS DAILY
Status: COMPLETED | OUTPATIENT
Start: 2023-06-23 | End: 2023-06-26

## 2023-06-22 RX ORDER — POLYETHYLENE GLYCOL 3350 17 G/17G
17 POWDER, FOR SOLUTION ORAL DAILY PRN
Status: DISCONTINUED | OUTPATIENT
Start: 2023-06-22 | End: 2023-06-29 | Stop reason: HOSPADM

## 2023-06-22 RX ADMIN — THIAMINE HCL TAB 100 MG 100 MG: 100 TAB at 07:58

## 2023-06-22 RX ADMIN — POTASSIUM CHLORIDE 20 MEQ: 1.5 FOR SOLUTION ORAL at 01:01

## 2023-06-22 RX ADMIN — DIAZEPAM 10 MG: 5 TABLET ORAL at 17:55

## 2023-06-22 RX ADMIN — POTASSIUM & SODIUM PHOSPHATES POWDER PACK 280-160-250 MG 1 PACKET: 280-160-250 PACK at 04:19

## 2023-06-22 RX ADMIN — DIAZEPAM 10 MG: 5 TABLET ORAL at 15:42

## 2023-06-22 RX ADMIN — GABAPENTIN 900 MG: 300 CAPSULE ORAL at 15:34

## 2023-06-22 RX ADMIN — POTASSIUM & SODIUM PHOSPHATES POWDER PACK 280-160-250 MG 2 PACKET: 280-160-250 PACK at 13:05

## 2023-06-22 RX ADMIN — FOLIC ACID 1 MG: 1 TABLET ORAL at 07:59

## 2023-06-22 RX ADMIN — GABAPENTIN 900 MG: 300 CAPSULE ORAL at 22:35

## 2023-06-22 RX ADMIN — POTASSIUM & SODIUM PHOSPHATES POWDER PACK 280-160-250 MG 1 PACKET: 280-160-250 PACK at 01:00

## 2023-06-22 RX ADMIN — MULTIPLE VITAMINS W/ MINERALS TAB 1 TABLET: TAB at 07:58

## 2023-06-22 RX ADMIN — GABAPENTIN 900 MG: 300 CAPSULE ORAL at 07:59

## 2023-06-22 RX ADMIN — POTASSIUM PHOSPHATE, MONOBASIC AND POTASSIUM PHOSPHATE, DIBASIC 30 MMOL: 224; 236 INJECTION, SOLUTION, CONCENTRATE INTRAVENOUS at 14:56

## 2023-06-22 RX ADMIN — POTASSIUM & SODIUM PHOSPHATES POWDER PACK 280-160-250 MG 2 PACKET: 280-160-250 PACK at 09:06

## 2023-06-22 ASSESSMENT — ACTIVITIES OF DAILY LIVING (ADL)
ADLS_ACUITY_SCORE: 41
ADLS_ACUITY_SCORE: 41
ADLS_ACUITY_SCORE: 43
ADLS_ACUITY_SCORE: 39
ADLS_ACUITY_SCORE: 39
ADLS_ACUITY_SCORE: 43
ADLS_ACUITY_SCORE: 39
ADLS_ACUITY_SCORE: 39
ADLS_ACUITY_SCORE: 41
ADLS_ACUITY_SCORE: 41
ADLS_ACUITY_SCORE: 43
ADLS_ACUITY_SCORE: 43

## 2023-06-22 NOTE — PROGRESS NOTES
"[facility]    Medicine Progress Note - Hospitalist Service    Date of Admission:  6/21/2023    Assessment & Plan   48 year old male with history of heavy alcohol use admitted on 6/21/2023. He presented to the ER from [facility] for evaluation of alcohol withdrawal and was subsequently admitted.  Started on Valium with relatively good control of his symptoms.  Was started on high-dose IV thiamine due to some ataxia although there was lower suspicion for Wernicke's.    Labs notable for hypokalemia, hypomagnesemia, and hypophosphatemia for which he was repleted for.  These were serially monitored given risk of refeeding syndrome.    Labs also showed transaminitis likely due to chronic alcohol use with abdominal ultrasound noting enlarged fatty liver, without evidence of cirrhosis.  Additional testing was sent off to assess for underlying liver disease.    -Withdrawal seems to be stable with as needed Valium.  Monitor for refeeding syndrome over the next 24 hours.  If stable by tomorrow, likely discharge back to [facility].    Alcohol withdrawal syndrome with perceptual disturbance   Was sent to the emergency department from [facility] for alcohol withdrawal that was not well controlled there. Drinks about 850 mL vodka daily, last drink on 6/18/23. Has never been through treatment before but has withdrawn, no history of seizures.   -Mild hand tremulousness today but otherwise appears stable  -Continue with CIWA and as needed Valium  -Stop clonidine, continue gabapentin  -MVI, Folate  -Continue IV Thiamine but non-Wernicke dose as less suspicion for it currently    Transaminitis   Fatty Liver  Presented with alcohol pattern LFTs (ALT 44, ), elevated total bilirubin (7.2), elevated INR (1.52)Hepatomegaly and scleral icterus noted on exam. Abdominal ultrasound demonstrates \"1.  Mildly enlarged fatty liver. 2.  Presumed tumefactive sludge within the gallbladder " "lumen. Recommend short-term ultrasound follow-up in 8-12 weeks. No cholelithiasis. 3.  Trace ascites.\" Presumably has alcoholic liver disease, although cannot exclude other etiologies.   -LFTs improving  - Work-up sent: viral hepatitis panel, Anti Nuclear antibody IGG, GGT, F actin EIA, vitamin V1, transferrin  -Recommend repeat US in 2-3 months    Hypokalemia   Hypomagnesemia   Hypophosphatemia   At risk for Refeeding Syndrome  Admit K = 2.7 / Mg = 1.4 / Phos = 1.3. Likely due to poor intake other than alcohol, is at risk for refeeding syndrome.  -Replete with p.o. and monitor  -Recheck later today    Normocytic anemia  Thrombocytopenia   Admit platelets 96.   Iron studies show low iron (54) and low TIBC, (141), normal iron saturation index (38), transferrin 108.   Likely anemia chronic disease and thrombocytopenia due to alcohol use  -Continue to monitor intermittently  -Consider rechecking ferritin as outpatient once stable    Generalized weakness  Patient weak and unsteady secondary to withdrawal, expect to improve as patient completes withdrawal process.  -PT/OT consulted for assessment prior to return back to [facility]       Diet: Combination Diet Regular Diet Adult    DVT Prophylaxis: Pneumatic Compression Devices  Ferro Catheter: Not present  Lines: None     Cardiac Monitoring: None  Code Status: Full Code      Clinically Significant Risk Factors        # Hypokalemia: Lowest K = 2.6 mmol/L in last 2 days, will replace as needed     # Hypomagnesemia: Lowest Mg = 1.4 mg/dL in last 2 days, will replace as needed   # Hypoalbuminemia: Lowest albumin = 3.2 g/dL at 6/22/2023  6:29 AM, will monitor as appropriate  # Coagulation Defect: INR = 1.52 (Ref range: 0.85 - 1.15) and/or PTT = N/A, will monitor for bleeding  # Thrombocytopenia: Lowest platelets = 96 in last 2 days, will monitor for bleeding                   Disposition Plan      Expected Discharge Date: 06/23/2023                  Denver Saeed, " MD  Hospitalist Service  [facility]  Securely message with [employer] (more info)  Text page via [employer] Paging/Directory   ______________________________________________________________________    Interval History   No acute events overnight.     Patient seen and evaluated at bedside. Doing okay this morning, feeling slightly better.  Denies chest pain, shortness of breath, abdominal pain.    Feels like withdrawal is improving slightly.    Physical Exam   Vital Signs: Temp: 98.5  F (36.9  C) Temp src: Oral BP: 95/67 Pulse: 90   Resp: 18 SpO2: 95 % O2 Device: None (Room air)    Weight: 144 lbs 9.95 oz    General: Sitting up in bed, nontoxic-appearing  CV: +S1/S2, no pitting edema  Respiratory: clear to auscultation bilaterally  GI: soft, distended, nontender  Neuro: alert    Medical Decision Making       50 MINUTES SPENT BY ME on the date of service doing chart review, history, exam, documentation & further activities per the note.      Data     I have personally reviewed the following data over the past 24 hrs:    10.7  \   11.8 (L)   / 105 (L)     132 (L) 95 (L) 7.5 /  95   3.4 26 0.56 (L) \       ALT: 46 AST: 170 (H) AP: 209 (H) TBILI: 8.3 (H)   ALB: 3.2 (L) TOT PROTEIN: 5.7 (L) LIPASE: N/A       INR:  N/A PTT:  N/A   D-dimer:  N/A Fibrinogen:  N/A       Imaging results reviewed over the past 24 hrs:   No results found for this or any previous visit (from the past 24 hour(s)).

## 2023-06-22 NOTE — PROGRESS NOTES
Patient CIWA scores today have a been 4, 4, 4, 9, 8. Medicated for 9 and 8 per MAR. Patient ate very minimal dinner stated he was not hungry but has been hesitant to help all day stating he can do it on his own. Hallucinating he was eating food that was not there at dinner, and that he wanted to save his aliza food cake for his grandpa. Forgot he was in the hospital after spilling his cranberry juice requiring a linen change. States he wants to go home.   Jaundice is present with sclera yellowing as well.  PIV infusing Phosphorus. Patient required 2 people to move in bed for linen change after spilling drink.

## 2023-06-22 NOTE — UTILIZATION REVIEW
"Admission Status; Secondary Review Determination         Under the authority of the Utilization Management Committee, the utilization review process indicated a secondary review on the above patient.  The review outcome is based on review of the medical records, discussions with staff, and applying clinical experience noted on the date of the review.          (x) Observation Status Appropriate - This patient does not meet hospital inpatient criteria and is placed in observation status. If this patient's primary payer is Medicare and was admitted as an inpatient, Condition Code 44 should be used and patient status changed to \"observation\".     RATIONALE FOR DETERMINATION     Guillermo Oden is a 48 year old male admitted on 6/21/2023. He presented to the emergency department from [facility] for evaluation of alcohol withdrawal that was not able to be managed at [facility].  Emergency department evaluation suggested alcoholic hepatitis with total bilirubin 7.2, alk phos 210, ALT 44, and . Platelets were low at 96. INR was high at 1.52. Abdominal ultrasound showed mildly enlarged fatty liver, presumed tumefactive sludge within the gallbladder lumen, and trace ascites. He was admitted for further cares of acute alcohol withdrawal.  He is being treated with scheduled gabapentin.  Initially, he received scheduled clonidine but this was discontinued (presumably due to low normal blood pressures). CIWA scores have not been high enough for him to require prn benzodiazapine.     The severity of illness, intensity of service provided, expected LOS and risk for adverse outcome make the care appropriate for further observation; however, doesn't meet criteria for hospital inpatient admission. Dr Denver Saeed was notified of this determination.    This document was produced using voice recognition software.      The information on this document is developed by the utilization review team in order for the " business office to ensure compliance.  This only denotes the appropriateness of proper admission status and does not reflect the quality of care rendered.         The definitions of Inpatient Status and Observation Status used in making the determination above are those provided in the CMS Coverage Manual, Chapter 1 and Chapter 6, section 70.4.      Sincerely,    Christopher Rivas MD    Utilization Review  Physician Advisor  [facility].

## 2023-06-22 NOTE — PROGRESS NOTES
Patient wanted to know who was walking outside - but could not see his window. Seeing his mirror in his room. While giving Gabapentin he stated he dropped them (he did drop one on his lap this morning) and was trying pick at his call light. CIWA score based on tremors, agitation, and hallucinations is 9 - medicated per MAR,

## 2023-06-22 NOTE — PROGRESS NOTES
06/22/23 9883   Appointment Info   Signing Clinician's Name / Credentials (PT) Erica Sales, PT   Living Environment   Living Environment Comments pt admitted from [facility] treatment facility   Self-Care   Usual Activity Tolerance good   Current Activity Tolerance poor   Equipment Currently Used at Home none   General Information   Onset of Illness/Injury or Date of Surgery 06/21/23   Referring Physician Denver Saeed MD   Patient/Family Therapy Goals Statement (PT) to return to [facility]   Pertinent History of Current Problem (include personal factors and/or comorbidities that impact the POC) admitted from [facility] due to tremors and withdrawal   Existing Precautions/Restrictions fall   Cognition   Affect/Mental Status (Cognition) flat/blunted affect   Follows Commands (Cognition) increased processing time needed;physical/tactile prompts required;repetition of directions required   Bed Mobility   Comment, (Bed Mobility) pt attempted but unable to pull his trunk up or get legs off the EOB himself, mod A needed supine to sit   Transfers   Comment, (Transfers) sit to stand without walker max A x 1, strong L lean and in standing conts to lean L and backwards mod A x 1 in standing   Gait/Stairs (Locomotion)   Comment, (Gait/Stairs) unable   Clinical Impression   Criteria for Skilled Therapeutic Intervention Yes, treatment indicated   PT Diagnosis (PT) impaired functional mobility   Influenced by the following impairments impaired balance, weakness   Functional limitations due to impairments impaired bed mob and transfers   Clinical Presentation (PT Evaluation Complexity) Evolving/Changing   Clinical Presentation Rationale clinical judgement   Clinical Decision Making (Complexity) low complexity   Planned Therapy Interventions (PT) balance training;bed mobility training;gait training;transfer training   Risk & Benefits of therapy have been explained evaluation/treatment results reviewed;care plan/treatment goals  reviewed;risks/benefits reviewed;current/potential barriers reviewed;participants voiced agreement with care plan;patient   PT Total Evaluation Time   PT Eval, Low Complexity Minutes (59833) 10   Physical Therapy Goals   PT Frequency Daily   PT Predicted Duration/Target Date for Goal Attainment 06/29/23   PT Goals Bed Mobility;Transfers;Gait   PT: Bed Mobility Independent;Supine to/from sit   PT: Transfers Independent;Sit to/from stand   PT: Gait Independent;Greater than 200 feet   Interventions   Interventions Quick Adds Therapeutic Activity   Therapeutic Activity   Therapeutic Activities: dynamic activities to improve functional performance Minutes (37680) 10   Treatment Detail/Skilled Intervention step by step cues for getting legs off the bed and to scoot, pt at times not following the instructions and mod A tactile cues needed to get legs over the bed and cues to scoot but unable to follow to attemt, mod A to scoot, second sit to stand with FWW max A x 1, assist to find midline and upright posture but not able to maintain and falls to the L, sit to supine cued and able to scoot self and min A to get into bed   PT Discharge Planning   PT Plan daily- bed mob, gait when able, needs to be indep without AD to return to [facility]   PT Discharge Recommendation (DC Rec)   (chem dep treatment if indep otherwise TCU with chem dep treatment)   PT Rationale for DC Rec currently mod A x1 and not able to ambulate, leans heavily to his L side, mod to max A to keep him upright in standing with walker and without walker   PT Brief overview of current status mod A x 1 supine to sit and max A x 1 sit to stand, leans to his L in sitting and standing and even in bed, not able to take any steps yet, slow processing and some difficulty following commands   Total Session Time   Timed Code Treatment Minutes 10   Total Session Time (sum of timed and untimed services) 20

## 2023-06-22 NOTE — PROVIDER NOTIFICATION
3624 Message sent via [address] to Dr. Miranda to notify her of critical potassium level 2.6. Pt is on potassium protocol and will be replaced and rechecked at 0530 as previously ordered. No new orders.

## 2023-06-22 NOTE — PROGRESS NOTES
Time: Assumed care of this patient at 1900    Reason for Admission: Alcohol withdrawl    Activity: bedrest    Neuro: A/O disoriented to time at beginning of shift but is A/O x 4 now.    GI/: Due to void.     Respiratory WNL    Cardiac WNL    Skin: Scattered bruises    Diet: Combination regular diet    IV Access: 2 PIV SL    Vitals:Temp: 98.5  F (36.9  C) Temp src: Oral BP: 95/67 Pulse: 90   Resp: 18 SpO2: 95 % O2 Device: None (Room air)      Pain: Denies    Plan:  Phosphorus, Magnesium,Potassium, and CIWA protocols     CIWA have been 1. He has been sleeping hard all shift. Wakes up for CIWA and meds then veloz to sleep. Able to swallow pills one at a time.

## 2023-06-22 NOTE — PROGRESS NOTES
"Patient attempting to get out of bed to use toilet. Did not set bed alarm off-  Nurse saw feet coming off bed. Patient is unable to sit on his own, he has a strong L lean. Pivot to commode with very heavy assist of 2. Patient continues to state \"I can do it on my own\" and is much more agitated that this morning. Will continue to monitor.  "

## 2023-06-22 NOTE — CONSULTS
Care Transitions Note:    CTS received referral for pt to return to [facility].      In order to return to [facility], pt must be able to ambulate independently, be cognitively able to follow directions/programming & be accepted back for cares by Brenda's MD.    When pt is medically stable for discharge & can return to [facility], a MD to MD call needed at [phone].    B[facility] staff will then arrange transportation & next steps for the pt.      Care Transitions services are not needed for pt's who discharge back to [facility].    AMRIT Olivera  [facility] [phone]   [facility]  [phone]

## 2023-06-23 ENCOUNTER — APPOINTMENT (OUTPATIENT)
Dept: PHYSICAL THERAPY | Facility: CLINIC | Age: 48
End: 2023-06-23
Payer: COMMERCIAL

## 2023-06-23 ENCOUNTER — APPOINTMENT (OUTPATIENT)
Dept: OCCUPATIONAL THERAPY | Facility: CLINIC | Age: 48
End: 2023-06-23
Payer: COMMERCIAL

## 2023-06-23 LAB
ALBUMIN SERPL BCG-MCNC: 3 G/DL (ref 3.5–5.2)
ALP SERPL-CCNC: 196 U/L (ref 40–129)
ALT SERPL W P-5'-P-CCNC: 49 U/L (ref 0–70)
ANION GAP SERPL CALCULATED.3IONS-SCNC: 14 MMOL/L (ref 7–15)
AST SERPL W P-5'-P-CCNC: 159 U/L (ref 0–45)
BILIRUB SERPL-MCNC: 9.6 MG/DL
BUN SERPL-MCNC: 9 MG/DL (ref 6–20)
CALCIUM SERPL-MCNC: 8.2 MG/DL (ref 8.6–10)
CHLORIDE SERPL-SCNC: 95 MMOL/L (ref 98–107)
CREAT SERPL-MCNC: 0.46 MG/DL (ref 0.67–1.17)
DEPRECATED HCO3 PLAS-SCNC: 25 MMOL/L (ref 22–29)
GFR SERPL CREATININE-BSD FRML MDRD: >90 ML/MIN/1.73M2
GLUCOSE SERPL-MCNC: 83 MG/DL (ref 70–99)
HOLD SPECIMEN: NORMAL
HOLD SPECIMEN: NORMAL
INR PPP: 1.78 (ref 0.85–1.15)
MAGNESIUM SERPL-MCNC: 1.9 MG/DL (ref 1.7–2.3)
PHOSPHATE SERPL-MCNC: 2.7 MG/DL (ref 2.5–4.5)
POTASSIUM SERPL-SCNC: 3.3 MMOL/L (ref 3.4–5.3)
POTASSIUM SERPL-SCNC: 3.9 MMOL/L (ref 3.4–5.3)
PROT SERPL-MCNC: 5.2 G/DL (ref 6.4–8.3)
SODIUM SERPL-SCNC: 134 MMOL/L (ref 136–145)
VIT B1 PYROPHOSHATE BLD-SCNC: 123 NMOL/L

## 2023-06-23 PROCEDURE — 97530 THERAPEUTIC ACTIVITIES: CPT | Mod: GO

## 2023-06-23 PROCEDURE — 97165 OT EVAL LOW COMPLEX 30 MIN: CPT | Mod: GO

## 2023-06-23 PROCEDURE — 97530 THERAPEUTIC ACTIVITIES: CPT | Mod: GP | Performed by: PHYSICAL MEDICINE & REHABILITATION

## 2023-06-23 PROCEDURE — 36415 COLL VENOUS BLD VENIPUNCTURE: CPT | Performed by: HOSPITALIST

## 2023-06-23 PROCEDURE — 250N000011 HC RX IP 250 OP 636: Performed by: HOSPITALIST

## 2023-06-23 PROCEDURE — 250N000013 HC RX MED GY IP 250 OP 250 PS 637: Performed by: PHYSICIAN ASSISTANT

## 2023-06-23 PROCEDURE — 250N000012 HC RX MED GY IP 250 OP 636 PS 637: Performed by: HOSPITALIST

## 2023-06-23 PROCEDURE — 83735 ASSAY OF MAGNESIUM: CPT | Performed by: HOSPITALIST

## 2023-06-23 PROCEDURE — 250N000013 HC RX MED GY IP 250 OP 250 PS 637: Performed by: HOSPITALIST

## 2023-06-23 PROCEDURE — 84100 ASSAY OF PHOSPHORUS: CPT | Performed by: HOSPITALIST

## 2023-06-23 PROCEDURE — 80053 COMPREHEN METABOLIC PANEL: CPT | Performed by: HOSPITALIST

## 2023-06-23 PROCEDURE — 99232 SBSQ HOSP IP/OBS MODERATE 35: CPT | Performed by: HOSPITALIST

## 2023-06-23 PROCEDURE — 84132 ASSAY OF SERUM POTASSIUM: CPT | Performed by: HOSPITALIST

## 2023-06-23 PROCEDURE — 120N000001 HC R&B MED SURG/OB

## 2023-06-23 PROCEDURE — 85610 PROTHROMBIN TIME: CPT | Performed by: HOSPITALIST

## 2023-06-23 RX ORDER — POTASSIUM CHLORIDE 1500 MG/1
40 TABLET, EXTENDED RELEASE ORAL ONCE
Status: COMPLETED | OUTPATIENT
Start: 2023-06-23 | End: 2023-06-23

## 2023-06-23 RX ORDER — POTASSIUM CHLORIDE 1500 MG/1
40 TABLET, EXTENDED RELEASE ORAL ONCE
Status: DISCONTINUED | OUTPATIENT
Start: 2023-06-23 | End: 2023-06-23

## 2023-06-23 RX ORDER — PREDNISOLONE SODIUM PHOSPHATE 15 MG/5ML
40 SOLUTION ORAL DAILY
Status: DISCONTINUED | OUTPATIENT
Start: 2023-06-23 | End: 2023-06-29 | Stop reason: HOSPADM

## 2023-06-23 RX ADMIN — THIAMINE HYDROCHLORIDE 100 MG: 100 INJECTION, SOLUTION INTRAMUSCULAR; INTRAVENOUS at 08:06

## 2023-06-23 RX ADMIN — GABAPENTIN 900 MG: 300 CAPSULE ORAL at 14:55

## 2023-06-23 RX ADMIN — PREDNISOLONE ORAL 40 MG: 15 SOLUTION ORAL at 13:25

## 2023-06-23 RX ADMIN — DIAZEPAM 10 MG: 5 TABLET ORAL at 08:04

## 2023-06-23 RX ADMIN — ACETAMINOPHEN 650 MG: 325 TABLET, FILM COATED ORAL at 01:02

## 2023-06-23 RX ADMIN — FOLIC ACID 1 MG: 1 TABLET ORAL at 08:06

## 2023-06-23 RX ADMIN — MULTIPLE VITAMINS W/ MINERALS TAB 1 TABLET: TAB at 08:04

## 2023-06-23 RX ADMIN — GABAPENTIN 900 MG: 300 CAPSULE ORAL at 08:05

## 2023-06-23 RX ADMIN — POTASSIUM CHLORIDE 40 MEQ: 1500 TABLET, EXTENDED RELEASE ORAL at 08:05

## 2023-06-23 RX ADMIN — GABAPENTIN 900 MG: 300 CAPSULE ORAL at 22:45

## 2023-06-23 ASSESSMENT — ACTIVITIES OF DAILY LIVING (ADL)
ADLS_ACUITY_SCORE: 39
ADLS_ACUITY_SCORE: 39
ADLS_ACUITY_SCORE: 45
ADLS_ACUITY_SCORE: 43
ADLS_ACUITY_SCORE: 43
ADLS_ACUITY_SCORE: 39
ADLS_ACUITY_SCORE: 47
ADLS_ACUITY_SCORE: 45
ADLS_ACUITY_SCORE: 45
ADLS_ACUITY_SCORE: 43
ADLS_ACUITY_SCORE: 39
ADLS_ACUITY_SCORE: 43

## 2023-06-23 NOTE — PROGRESS NOTES
Care Management Note:    As pt came to [facility] from [facility], SW called Brenda, spoke asher John, [phone], who shared pt was admitted to their program, was on their medical detox unit prior to being sent to our hospital, and can return once pt able to ambulate independently, able to manage his bowel & bladder, and has cognitive ability to engage in programming, pt will be accepted back to [facility].    In order for a return to happen, a MD to MD call needed at [phone] for acceptance back into their program.     If accepted for return, a RN from [facility] will call our RN to coordinate transportation for patient to return to [facility].      Per PT on 6/22/23, pt is mod assist of 1 & not able to ambulate & recommends TCU.  Unfortunately, pt listed as self pay & has no payor source for TCU cares.  Keiko, Patient  at [phone], aware & was informed by pt that his SO or his aunt should have paid his premium, however, that was not showing up in the system as of yesterday.      Plan:  Return to [facility] vs discharge to home when medical stable to leave the hospital.  If pt does not return to [facility], he lives in [place] [address] and will need family/friend to transport home.    Care Management to close referral at this    AMRIT Olivera  Care Transitions   Tele: [phone]

## 2023-06-23 NOTE — PLAN OF CARE
Patient has been more alert this afternoon. Does get frustrated at times and did tell the nursing assistant to leave his room. Incontinent of bowel and bladder. Did use commode as well, but for the most part is incontinent before he realizes he needs to use the commode. He wants to walk, but can't stand up well enough to even take a couple steps. Needs assist of 2, gait belt and walker. Last CIWA was 3. Mom called and was updated with his permission.

## 2023-06-23 NOTE — UTILIZATION REVIEW
Admission Status; Secondary Review Determination       Under the authority of the Utilization Management Committee, the utilization review process indicated a secondary review on the above patient. The review outcome is based on review of the medical records, discussions with staff, and applying clinical experience noted on the date of the review.     (x) Inpatient Status Appropriate - This patient's medical care is consistent with medical management for inpatient care and reasonable inpatient medical practice.     RATIONALE FOR DETERMINATION     Guillermo Oden is a 48 year old male admitted on 6/21/2023. He presented to the emergency department from [facility] for evaluation of alcohol withdrawal that was not able to be managed at [facility].  Emergency department evaluation suggested alcoholic hepatitis with total bilirubin 7.2, alk phos 210, ALT 44, and . Platelets were low at 96. INR was high at 1.52. Abdominal ultrasound showed mildly enlarged fatty liver, presumed tumefactive sludge within the gallbladder lumen, and trace ascites. He was admitted for further cares of acute alcohol withdrawal.  He was treated with scheduled gabapentin.  Initially, he received scheduled clonidine but this was discontinued (presumably due to low normal blood pressures). Initially, CIWA scores were not high enough for him to require prn benzodiazapine but after a day and a half in the hospital he started to need as needed valium in addition to the scheduled gabapentin. Also, LFTs last during hospital stay, suggesting worsening alcohol related hepatitis. Prednisolone was started and LFTs will require further monitoring.       At the time of admission with the information available to the attending physician more than 2 nights Hospital complex care was anticipated, based on patient risk of adverse outcome if treated as outpatient and complex care required. Inpatient admission is appropriate based on the Medicare  guidelines.     This document was produced using voice recognition software       The information on this document is developed by the utilization review team in order for the business office to ensure compliance. This only denotes the appropriateness of proper admission status and does not reflect the quality of care rendered.   The definitions of Inpatient Status and Observation Status used in making the determination above are those provided in the CMS Coverage Manual, Chapter 1 and Chapter 6, section 70.4.   Sincerely,     Christopher Rivas MD    Utilization Review  Physician Advisor  [facility].

## 2023-06-23 NOTE — PLAN OF CARE
Patient attempting to get out of bed unassisted this am. Angry with staff for bringing a commode at bedside. He is unable to walk other than a few steps and would not be able to get to the bathroom. Assist of 3 needed to get him onto the commode. Tremulous, weak and can barely hold himself upright. Incontinent of stool. Needed 2 to hold him up and another to wash him up. He wants to do things for himself, but is unable and then her gets frustrated and beligerent with staff. Scored 21 on the CIWA scale, was medicated with valium 10 mg and then 30 minutes later scored 3 and asleep. Skin and sclera jaundiced. Given potassium this am per RN managed protocol. Has lab ordered for 1222.

## 2023-06-23 NOTE — PROGRESS NOTES
"[facility]    Medicine Progress Note - Hospitalist Service    Date of Admission:  6/21/2023    Assessment & Plan   48 year old male with history of heavy alcohol use admitted on 6/21/2023. He presented to the ER from [facility] for evaluation of alcohol withdrawal and was subsequently admitted.  Started on Valium with relatively good control of his symptoms and high-dose IV thiamine due to some ataxia, although there was lower suspicion for Wernicke's.    Labs notable for hypokalemia, hypomagnesemia, and hypophosphatemia which were repleted.  These were serially monitored given risk of refeeding syndrome and required repletion.    Labs also showed a transaminitis likely due to chronic alcohol use, with abdominal ultrasound noting enlarged fatty liver, without evidence of cirrhosis.  Additional testing was sent off to assess for underlying liver disease.    Seen by PT/OT and required assistance.    -Requiring few doses of Valium overnight for continued withdrawal.  Bilirubin is uptrending, will check INR Maddrey's discriminant function and potentially start steroids.  Continue to replete electrolytes.    Alcohol withdrawal syndrome with perceptual disturbance   Was sent to the emergency department from [facility] for alcohol withdrawal that was not well controlled there. Drinks about 850 mL vodka daily, last drink on 6/18/23. Has never been through treatment before but has withdrawn, no history of seizures.   -Continue tremulousness  -Continue with CIWA and as needed Valium -received doses overnight  -Continue Gabapentin taper  -MVI, Folate  -Thiamine 100 mg IV Daily    Transaminitis  Alcoholic Hepatitis  Fatty Liver  Presented with alcohol pattern LFTs (ALT 44, ), elevated total bilirubin (7.2), elevated INR (1.52)Hepatomegaly and scleral icterus noted on exam. Abdominal ultrasound demonstrates \"1.  Mildly enlarged fatty liver. 2.  Presumed tumefactive " "sludge within the gallbladder lumen. Recommend short-term ultrasound follow-up in 8-12 weeks. No cholelithiasis. 3.  Trace ascites.\" Presumably has alcoholic liver disease, although cannot exclude other etiologies.   Work Up: Viral Hepatitis Panel negative, Smooth muscle Ab and JESSICA negative, Transferrin 108 (low)    -Bilirubin uptending, 8.3 to 9.6  -Will check PT and potentially start Prednisolone depending on MDF  -Recommend repeat abdominal US in 2-3 months    Addendum: Will start Prednisolone 40 mg Daily given MDF of 45    Hypokalemia   Hypomagnesemia   Hypophosphatemia   At risk for Refeeding Syndrome  Admit K = 2.7 / Mg = 1.4 / Phos = 1.3. Likely due to poor intake other than alcohol, is at risk for refeeding syndrome.  -Replete with p.o. and continue to monitor    Normocytic anemia  Thrombocytopenia   Admit platelets 96.   Iron studies show low iron (54) and low TIBC, (141), normal iron saturation index (38), transferrin 108.   Likely anemia chronic disease and thrombocytopenia due to alcohol use  -Continue to monitor intermittently  -Consider rechecking ferritin as outpatient once stable     Generalized weakness  Patient weak and unsteady secondary to withdrawal, expect to improve as patient completes withdrawal process.  -PT/OT consulted and, appreciate recs - 1 assist at this time, will work on mobility       Diet: Combination Diet Regular Diet Adult    DVT Prophylaxis: Pneumatic Compression Devices  Ferro Catheter: Not present  Lines: None     Cardiac Monitoring: None  Code Status: Full Code      Clinically Significant Risk Factors        # Hypokalemia: Lowest K = 2.6 mmol/L in last 2 days, will replace as needed       # Hypoalbuminemia: Lowest albumin = 3 g/dL at 6/23/2023  6:08 AM, will monitor as appropriate  # Coagulation Defect: INR = 1.52 (Ref range: 0.85 - 1.15) and/or PTT = N/A, will monitor for bleeding  # Thrombocytopenia: Lowest platelets = 105 in last 2 days, will monitor for bleeding          "          Disposition Plan     Expected Discharge Date: 06/23/2023                  Denver Saeed MD  Hospitalist Service  [facility]  Securely message with [employer] (more info)  Text page via [employer] Paging/Directory   ______________________________________________________________________    Interval History   No acute events overnight.     Patient seen and evaluated at bedside.  Feels better today, does have tremulousness still.  Denies chest pain shortness of breath.  Appetite improving.  Explained to him that we may start steroids for his liver depending on his lab testing.    Physical Exam   Vital Signs: Temp: 97.4  F (36.3  C) Temp src: Oral BP: 109/79 Pulse: 75   Resp: 18 SpO2: 94 % O2 Device: None (Room air)    Weight: 145 lbs 8.06 oz    General: Appears relatively comfortable, mild hand tremulousness  CV: +S1/S2, no pitting edema  Respiratory: CTA BL  GI: soft, distended, NT, no guarding  Neuro: alert    Medical Decision Making               Data     I have personally reviewed the following data over the past 24 hrs:    N/A  \   N/A   / N/A     134 (L) 95 (L) 9.0 /  83   3.3 (L) 25 0.46 (L) \       ALT: 49 AST: 159 (H) AP: 196 (H) TBILI: 9.6 (H)   ALB: 3.0 (L) TOT PROTEIN: 5.2 (L) LIPASE: N/A       Imaging results reviewed over the past 24 hrs:   No results found for this or any previous visit (from the past 24 hour(s)).

## 2023-06-23 NOTE — PROGRESS NOTES
Occupational Therapy     06/23/23 0904   Appointment Info   Signing Clinician's Name / Credentials (OT) Bobbi Sandhu, OTR/L   Quick Adds   Quick Adds Certification   Living Environment   People in Home significant other   Current Living Arrangements apartment   Home Accessibility no concerns   Living Environment Comments Pt lives on first floor of apt building - admitted from [facility].   Self-Care   Equipment Currently Used at Home none   Activity/Exercise/Self-Care Comment Pt reports indep with ADLs, does not use walking device at baseline.   Instrumental Activities of Daily Living (IADL)   Previous Responsibilities driving;shopping;meal prep;housekeeping;laundry;finances   IADL Comments Pt reports typically works full-time at [employer] though s taken time off d/t grandfather passing. Pt reports not on meds at home.   General Information   Onset of Illness/Injury or Date of Surgery 06/21/23   Referring Physician Denver Saeed MD   Patient/Family Therapy Goal Statement (OT) To return to [facility]   Additional Occupational Profile Info/Pertinent History of Current Problem Per H&P: Guillermo Oden is a 48 year old male admitted on 6/21/2023. He presented to the emergency department from [facility] for evaluation of alcohol withdrawal for which he is being admitted for further evaluation and treatment.   Existing Precautions/Restrictions fall   Cognitive Status Examination   Orientation Status orientation to person, place and time   Cognitive Status Comments Pt able to state month/year and knows he's in a hospital but unsure where, reorientation provided. Pt able to follow 2-step directions, conversation and communicate needs.   Pain Assessment   Patient Currently in Pain No   Range of Motion Comprehensive   General Range of Motion no range of motion deficits identified   Strength Comprehensive (MMT)   General Manual Muscle Testing (MMT) Assessment no strength deficits  identified   Bed Mobility   Bed Mobility supine-sit   Supine-Sit [place] (Bed Mobility) supervision   Assistive Device (Bed Mobility) bed rails   Comment (Bed Mobility) Pt completes supine>sit to L side of bed SBA HOB slightly elevated requiring increased time due to full-body tremors, uses L bed rail to pull self into sitting.   Transfers   Transfers sit-stand transfer   Sit-Stand Transfer   Sit-Stand [place] (Transfers) contact guard   Assistive Device (Sit-Stand Transfers) walker, front-wheeled   Sit/Stand Transfer Comments Pt completes sit>stand EOB to FWW CGA. Pt with slight L lean upon standing and increased BUE tremors requiring Lucrecia to maintain upright posture.   Clinical Impression   Criteria for Skilled Therapeutic Interventions Met (OT) Yes, treatment indicated   OT Diagnosis Decreased ADL indep/safety   Influenced by the following impairments Alcohol withdrawal syndrome with perceptual disturbance, generalized weakness   OT Problem List-Impairments impacting ADL problems related to;activity tolerance impaired;strength;balance;motor control;mobility;cognition   Assessment of Occupational Performance 3-5 Performance Deficits   Identified Performance Deficits transfers, functional mobility, standing tolerance, cognition   Planned Therapy Interventions (OT) ADL retraining;transfer training;strengthening;cognition   Clinical Decision Making Complexity (OT) low complexity   Risk & Benefits of therapy have been explained evaluation/treatment results reviewed;care plan/treatment goals reviewed;risks/benefits reviewed   OT Total Evaluation Time   OT Eval, Low Complexity Minutes (41145) 15   Therapy Certification   Start of Care Date 06/23/23   Certification date from 06/23/23   Certification date to 06/30/23   Medical Diagnosis Alcohol withdrawal syndrome with perceptual disturbance, generalized weakness   OT Goals   Therapy Frequency (OT) 5 times/wk   OT Predicted Duration/Target Date for Goal  "Attainment 06/30/23   OT Goals Hygiene/Grooming;Lower Body Dressing;Toilet Transfer/Toileting;Cognition   OT: Hygiene/Grooming supervision/stand-by assist;while standing  (10-15 minutes)   OT: Lower Body Dressing Supervision/stand-by assist   OT: Toilet Transfer/Toileting Supervision/stand-by assist;toilet transfer;cleaning and garment management   OT: Cognitive Patient/caregiver will verbalize understanding of cognitive assessment results/recommendations as needed for safe discharge planning   Therapeutic Activities   Therapeutic Activity Minutes (47550) 10   Symptoms noted during/after treatment fatigue  (increased BUE tremors)   Treatment Detail/Skilled Intervention Education provided on role of OT while in hospital with pt verbalizing understanding. Pt stands in place with FWW bedside Lucrecia to maintain upright posture as pt with L lean, slightly unsturdy on feet and BUE tremors. With time (~2 min) tremors subside and pt more sturdy. Pt marches in place x10 Lucrecia. Pt completes 4 steps forward/backward w/ FWW Lucrecia, unsturdy ambulating backward with gait becoming more shuffled. Pt wanting to use BR toilet, education provided to use commode for now as unsafe to ambulate to BR, pt not completely in agreement but verbalizes understanding. Pt completes sit>supine SBA, able to reposition self up in bed stating \"I couldn't do that yesterday.\" Pt remains supine in bed with call light and needs in reach, bed alarm activated.   OT Discharge Planning   OT Plan POC Fri 1/5: transfers, ambulating in room when able/safe, toileting, standing g/h   OT Discharge Recommendation (DC Rec)   ([facility] vs home with assist - Plan is to return to [facility] if able to ambulate independently, able to manage his bowel & bladder, and has cognitive ability to engage in programming.)   OT Rationale for DC Rec Pt comes from [facility], previously indep with I/ADLs currently limited by weakness and tremulousness " impacting ADL safety and indep. Pt able to follow 2-step directions, conversation and communicate needs today. Anticipate pt will progress with hospital stay to be able to return to [facility] and if not, home with assist for safety with mobility.   OT Brief overview of current status SBA supine<>sit; CGA sit>stand to FWW; Lucrecia standing/marching in place, 4 steps forward/backward (FWW)   Total Session Time   Timed Code Treatment Minutes 10   Total Session Time (sum of timed and untimed services) 25    [facility]  OUTPATIENT OCCUPATIONAL THERAPY  EVALUATION  PLAN OF TREATMENT FOR OUTPATIENT REHABILITATION  (COMPLETE FOR INITIAL CLAIMS ONLY)  Patient's Last Name, First Name, M.I.  YOB: 1975  Guillermo Oden                          Provider's Name  [facility] Medical Record No.  [id number]                             Onset Date:  06/21/23   Start of Care Date:  06/23/23   Type:     ___PT   _X_OT   ___SLP Medical Diagnosis:  Alcohol withdrawal syndrome with perceptual disturbance, generalized weakness                    OT Diagnosis:  Decreased ADL indep/safety Visits from SOC:  1     See note for plan of treatment, functional goals and certification details    I CERTIFY THE NEED FOR THESE SERVICES FURNISHED UNDER        THIS PLAN OF TREATMENT AND WHILE UNDER MY CARE     (Physician co-signature of this document indicates review and certification of the therapy plan).

## 2023-06-24 ENCOUNTER — APPOINTMENT (OUTPATIENT)
Dept: PHYSICAL THERAPY | Facility: CLINIC | Age: 48
End: 2023-06-24
Payer: COMMERCIAL

## 2023-06-24 ENCOUNTER — APPOINTMENT (OUTPATIENT)
Dept: OCCUPATIONAL THERAPY | Facility: CLINIC | Age: 48
End: 2023-06-24
Payer: COMMERCIAL

## 2023-06-24 LAB
ALBUMIN SERPL BCG-MCNC: 3.1 G/DL (ref 3.5–5.2)
ALP SERPL-CCNC: 226 U/L (ref 40–129)
ALT SERPL W P-5'-P-CCNC: 59 U/L (ref 0–70)
ANION GAP SERPL CALCULATED.3IONS-SCNC: 7 MMOL/L (ref 7–15)
AST SERPL W P-5'-P-CCNC: 161 U/L (ref 0–45)
BILIRUB SERPL-MCNC: 9.7 MG/DL
BUN SERPL-MCNC: 9.9 MG/DL (ref 6–20)
CALCIUM SERPL-MCNC: 8.7 MG/DL (ref 8.6–10)
CHLORIDE SERPL-SCNC: 96 MMOL/L (ref 98–107)
CREAT SERPL-MCNC: 0.52 MG/DL (ref 0.67–1.17)
DEPRECATED HCO3 PLAS-SCNC: 29 MMOL/L (ref 22–29)
ERYTHROCYTE [DISTWIDTH] IN BLOOD BY AUTOMATED COUNT: 14 % (ref 10–15)
GFR SERPL CREATININE-BSD FRML MDRD: >90 ML/MIN/1.73M2
GLUCOSE SERPL-MCNC: 138 MG/DL (ref 70–99)
HCT VFR BLD AUTO: 34.1 % (ref 40–53)
HCV RNA SERPL NAA+PROBE-ACNC: NOT DETECTED IU/ML
HGB BLD-MCNC: 11.4 G/DL (ref 13.3–17.7)
INR PPP: 1.73 (ref 0.85–1.15)
MAGNESIUM SERPL-MCNC: 1.8 MG/DL (ref 1.7–2.3)
MCH RBC QN AUTO: 33.2 PG (ref 26.5–33)
MCHC RBC AUTO-ENTMCNC: 33.4 G/DL (ref 31.5–36.5)
MCV RBC AUTO: 99 FL (ref 78–100)
PHOSPHATE SERPL-MCNC: 2.4 MG/DL (ref 2.5–4.5)
PLATELET # BLD AUTO: 177 10E3/UL (ref 150–450)
POTASSIUM SERPL-SCNC: 4.2 MMOL/L (ref 3.4–5.3)
PROT SERPL-MCNC: 5.4 G/DL (ref 6.4–8.3)
RBC # BLD AUTO: 3.43 10E6/UL (ref 4.4–5.9)
SODIUM SERPL-SCNC: 132 MMOL/L (ref 136–145)
WBC # BLD AUTO: 8.1 10E3/UL (ref 4–11)

## 2023-06-24 PROCEDURE — 84100 ASSAY OF PHOSPHORUS: CPT | Performed by: HOSPITALIST

## 2023-06-24 PROCEDURE — 250N000013 HC RX MED GY IP 250 OP 250 PS 637: Performed by: PHYSICIAN ASSISTANT

## 2023-06-24 PROCEDURE — 120N000001 HC R&B MED SURG/OB

## 2023-06-24 PROCEDURE — 250N000013 HC RX MED GY IP 250 OP 250 PS 637: Performed by: HOSPITALIST

## 2023-06-24 PROCEDURE — 250N000012 HC RX MED GY IP 250 OP 636 PS 637: Performed by: HOSPITALIST

## 2023-06-24 PROCEDURE — 83735 ASSAY OF MAGNESIUM: CPT | Performed by: HOSPITALIST

## 2023-06-24 PROCEDURE — 85014 HEMATOCRIT: CPT | Performed by: HOSPITALIST

## 2023-06-24 PROCEDURE — 250N000011 HC RX IP 250 OP 636: Performed by: HOSPITALIST

## 2023-06-24 PROCEDURE — 99232 SBSQ HOSP IP/OBS MODERATE 35: CPT | Performed by: HOSPITALIST

## 2023-06-24 PROCEDURE — 97535 SELF CARE MNGMENT TRAINING: CPT | Mod: GO

## 2023-06-24 PROCEDURE — 36415 COLL VENOUS BLD VENIPUNCTURE: CPT | Performed by: HOSPITALIST

## 2023-06-24 PROCEDURE — 85610 PROTHROMBIN TIME: CPT | Performed by: HOSPITALIST

## 2023-06-24 PROCEDURE — 82374 ASSAY BLOOD CARBON DIOXIDE: CPT | Performed by: HOSPITALIST

## 2023-06-24 PROCEDURE — 97530 THERAPEUTIC ACTIVITIES: CPT | Mod: GP

## 2023-06-24 RX ADMIN — MULTIPLE VITAMINS W/ MINERALS TAB 1 TABLET: TAB at 07:53

## 2023-06-24 RX ADMIN — GABAPENTIN 900 MG: 300 CAPSULE ORAL at 07:53

## 2023-06-24 RX ADMIN — GABAPENTIN 600 MG: 300 CAPSULE ORAL at 22:43

## 2023-06-24 RX ADMIN — POTASSIUM & SODIUM PHOSPHATES POWDER PACK 280-160-250 MG 1 PACKET: 280-160-250 PACK at 07:52

## 2023-06-24 RX ADMIN — PREDNISOLONE ORAL 40 MG: 15 SOLUTION ORAL at 07:52

## 2023-06-24 RX ADMIN — POTASSIUM & SODIUM PHOSPHATES POWDER PACK 280-160-250 MG 1 PACKET: 280-160-250 PACK at 14:52

## 2023-06-24 RX ADMIN — GABAPENTIN 900 MG: 300 CAPSULE ORAL at 14:52

## 2023-06-24 RX ADMIN — FOLIC ACID 1 MG: 1 TABLET ORAL at 07:53

## 2023-06-24 RX ADMIN — THIAMINE HYDROCHLORIDE 100 MG: 100 INJECTION, SOLUTION INTRAMUSCULAR; INTRAVENOUS at 07:52

## 2023-06-24 RX ADMIN — POTASSIUM & SODIUM PHOSPHATES POWDER PACK 280-160-250 MG 1 PACKET: 280-160-250 PACK at 11:24

## 2023-06-24 ASSESSMENT — ACTIVITIES OF DAILY LIVING (ADL)
ADLS_ACUITY_SCORE: 44
ADLS_ACUITY_SCORE: 43
ADLS_ACUITY_SCORE: 40
ADLS_ACUITY_SCORE: 43
ADLS_ACUITY_SCORE: 44
ADLS_ACUITY_SCORE: 44
ADLS_ACUITY_SCORE: 33
ADLS_ACUITY_SCORE: 40
ADLS_ACUITY_SCORE: 43
ADLS_ACUITY_SCORE: 40
ADLS_ACUITY_SCORE: 44
ADLS_ACUITY_SCORE: 43

## 2023-06-24 NOTE — PROGRESS NOTES
[facility]    Medicine Progress Note - Hospitalist Service    Date of Admission:  6/21/2023    Assessment & Plan   48 year old male with history of heavy alcohol use admitted on 6/21/2023. He presented to the ER from [facility] for evaluation of alcohol withdrawal and was subsequently admitted.  Started on Valium with relatively good control of his symptoms and high-dose IV thiamine due to some ataxia, although there was lower suspicion for Wernicke's.    Labs notable for hypokalemia, hypomagnesemia, and hypophosphatemia which were repleted.  These were serially monitored given risk of refeeding syndrome and required repletion.    Labs also showed a transaminitis likely due to chronic alcohol use, with abdominal ultrasound noting enlarged fatty liver, without evidence of cirrhosis.  Additional testing was sent off to assess for underlying liver disease.  Patient was started on prednisolone on 6/23 due to uptrending LFTs and elevated MDF.    Seen by PT/OT and required assistance.    -Continue CIWA as needed.  Continue prednisolone for alcoholic hepatitis.  Await for improvement of functional status.  Dispo pending improvement of alcoholic hepatitis.    Alcohol withdrawal syndrome with perceptual disturbance   Was sent to the emergency department from [facility] for alcohol withdrawal that was not well controlled there. Drinks about 850 mL vodka daily, last drink on 6/18/23. Has never been through treatment before but has withdrawn, no history of seizures.   -Continues to have mild tremulousness  -Continue with CIWA and as needed Valium -received doses overnight  -Continue Gabapentin taper  -MVI, Folate  -Thiamine 100 mg IV Daily    Transaminitis  Alcoholic Hepatitis  Fatty Liver  Presented with alcohol pattern LFTs (ALT 44, ), elevated total bilirubin (7.2), elevated INR (1.52)Hepatomegaly and scleral icterus noted on exam. Abdominal ultrasound  "demonstrates \"1.  Mildly enlarged fatty liver. 2.  Presumed tumefactive sludge within the gallbladder lumen. Recommend short-term ultrasound follow-up in 8-12 weeks. No cholelithiasis. 3.  Trace ascites.\" Presumably has alcoholic liver disease, although cannot exclude other etiologies.   Work Up: Viral Hepatitis Panel negative, Smooth muscle Ab and JESSICA negative, Transferrin 108 (low)    -Bilirubin mildly uptrending  -Prednisolone 40 mg daily started on 6/23 as MDF 45  -Recommend repeat abdominal US in 2-3 months    At risk for Refeeding Syndrome  -Continue monitor electrolytes and replete as needed    Normocytic anemia  Thrombocytopenia (Resolved)  Admit platelets 96.   Iron studies show low iron (54) and low TIBC, (141), normal iron saturation index (38), transferrin 108.   Likely anemia chronic disease and thrombocytopenia due to alcohol use  -Continue to monitor intermittently  -Consider checking ferritin as outpatient once stable and no active inflammation    Generalized weakness  Patient weak and unsteady secondary to withdrawal, expect to improve as patient completes withdrawal process.  -PT/OT consulted and, appreciate recs -requiring significant assistance    Resolved Problems  Hypokalemia  Hypomagnesemia   Hypophosphatemia   Admit K = 2.7 / Mg = 1.4 / Phos = 1.3. Likely due to poor intake other than alcohol, is at risk for refeeding syndrome.       Diet: Combination Diet Regular Diet Adult    DVT Prophylaxis: Pneumatic Compression Devices  Ferro Catheter: Not present  Lines: None     Cardiac Monitoring: None  Code Status: Full Code      Clinically Significant Risk Factors        # Hypokalemia: Lowest K = 3.3 mmol/L in last 2 days, will replace as needed       # Hypoalbuminemia: Lowest albumin = 3 g/dL at 6/23/2023  6:08 AM, will monitor as appropriate  # Coagulation Defect: INR = 1.73 (Ref range: 0.85 - 1.15) and/or PTT = N/A, will monitor for bleeding                    Disposition Plan     Expected " Discharge Date: 06/25/2023                  Denver Saeed MD  Hospitalist Service  [facility]  Securely message with [employer] (more info)  Text page via [employer] Paging/Directory   ______________________________________________________________________    Interval History   No acute events overnight.     Patient seen and evaluated at bedside.  Feels a bit better each day, although still tremulous.  Denies chest pain, shortness of breath, abdominal pain.  Explained to him that we are using steroids given that he has alcoholic hepatitis    Physical Exam   Vital Signs: Temp: 97.8  F (36.6  C) Temp src: Oral BP: 100/73 Pulse: 102   Resp: 16 SpO2: 98 % O2 Device: None (Room air)    Weight: 151 lbs 3.77 oz    General: Sitting up in bed, nontoxic-appearing but mildly tremulous  CV: +S1/S2, no pitting edema  Respiratory: Clear bilaterally, no wheezing/crackles  GI: soft, distended, nontender, no guarding  Neuro: alert    Medical Decision Making       45 MINUTES SPENT BY ME on the date of service doing chart review, history, exam, documentation & further activities per the note.      Data     I have personally reviewed the following data over the past 24 hrs:    8.1  \   11.4 (L)   / 177     132 (L) 96 (L) 9.9 /  138 (H)   4.2 29 0.52 (L) \       ALT: 59 AST: 161 (H) AP: 226 (H) TBILI: 9.7 (H)   ALB: 3.1 (L) TOT PROTEIN: 5.4 (L) LIPASE: N/A       INR:  1.73 (H) PTT:  N/A   D-dimer:  N/A Fibrinogen:  N/A       Imaging results reviewed over the past 24 hrs:   No results found for this or any previous visit (from the past 24 hour(s)).

## 2023-06-24 NOTE — PLAN OF CARE
CIWA scores have been 3 consistently tonight. Pt reports feeling tired & has been sleeping well.

## 2023-06-24 NOTE — PROGRESS NOTES
VAISHNAVI not requiring medications since 1500. Tremor noted only. He ate well for dinner. Up to chair currently.   [facility] staff updated of improvements. Patient updated that most of his belongings remain at the facility.   Uses call light appropriately.

## 2023-06-24 NOTE — PLAN OF CARE
Patient scoring 3 on CIWA scale this am. Has improved, since yesterday as he is moving much better and actually was able to walk in the hallway with assist of 2, walker and gait belt. Used the urinal and spilled some on his sheets, but he would have not been able to do this yesterday. More attentive and not as agitated today as well.

## 2023-06-25 ENCOUNTER — APPOINTMENT (OUTPATIENT)
Dept: PHYSICAL THERAPY | Facility: CLINIC | Age: 48
End: 2023-06-25
Payer: COMMERCIAL

## 2023-06-25 LAB
ALBUMIN SERPL BCG-MCNC: 2.9 G/DL (ref 3.5–5.2)
ALP SERPL-CCNC: 235 U/L (ref 40–129)
ALT SERPL W P-5'-P-CCNC: 58 U/L (ref 0–70)
ANION GAP SERPL CALCULATED.3IONS-SCNC: 6 MMOL/L (ref 7–15)
AST SERPL W P-5'-P-CCNC: 120 U/L (ref 0–45)
BILIRUB DIRECT SERPL-MCNC: 4.93 MG/DL (ref 0–0.3)
BILIRUB SERPL-MCNC: 6.6 MG/DL
BUN SERPL-MCNC: 8.7 MG/DL (ref 6–20)
CALCIUM SERPL-MCNC: 8.8 MG/DL (ref 8.6–10)
CHLORIDE SERPL-SCNC: 97 MMOL/L (ref 98–107)
CREAT SERPL-MCNC: 0.6 MG/DL (ref 0.67–1.17)
DEPRECATED HCO3 PLAS-SCNC: 31 MMOL/L (ref 22–29)
ERYTHROCYTE [DISTWIDTH] IN BLOOD BY AUTOMATED COUNT: 14.8 % (ref 10–15)
GFR SERPL CREATININE-BSD FRML MDRD: >90 ML/MIN/1.73M2
GLUCOSE SERPL-MCNC: 91 MG/DL (ref 70–99)
HCT VFR BLD AUTO: 33.9 % (ref 40–53)
HGB BLD-MCNC: 11.5 G/DL (ref 13.3–17.7)
MAGNESIUM SERPL-MCNC: 1.6 MG/DL (ref 1.7–2.3)
MCH RBC QN AUTO: 33 PG (ref 26.5–33)
MCHC RBC AUTO-ENTMCNC: 33.9 G/DL (ref 31.5–36.5)
MCV RBC AUTO: 97 FL (ref 78–100)
PHOSPHATE SERPL-MCNC: 2.5 MG/DL (ref 2.5–4.5)
PLATELET # BLD AUTO: 218 10E3/UL (ref 150–450)
POTASSIUM SERPL-SCNC: 3.6 MMOL/L (ref 3.4–5.3)
PROT SERPL-MCNC: 5.3 G/DL (ref 6.4–8.3)
RBC # BLD AUTO: 3.48 10E6/UL (ref 4.4–5.9)
SODIUM SERPL-SCNC: 134 MMOL/L (ref 136–145)
VIT B1 SERPL-SCNC: 9 NMOL/L
WBC # BLD AUTO: 10.6 10E3/UL (ref 4–11)

## 2023-06-25 PROCEDURE — 85027 COMPLETE CBC AUTOMATED: CPT | Performed by: HOSPITALIST

## 2023-06-25 PROCEDURE — 97110 THERAPEUTIC EXERCISES: CPT | Mod: GP

## 2023-06-25 PROCEDURE — 36415 COLL VENOUS BLD VENIPUNCTURE: CPT | Performed by: HOSPITALIST

## 2023-06-25 PROCEDURE — 84100 ASSAY OF PHOSPHORUS: CPT | Performed by: HOSPITALIST

## 2023-06-25 PROCEDURE — 250N000011 HC RX IP 250 OP 636: Mod: JZ | Performed by: HOSPITALIST

## 2023-06-25 PROCEDURE — 83735 ASSAY OF MAGNESIUM: CPT | Performed by: HOSPITALIST

## 2023-06-25 PROCEDURE — 120N000001 HC R&B MED SURG/OB

## 2023-06-25 PROCEDURE — 99232 SBSQ HOSP IP/OBS MODERATE 35: CPT | Performed by: HOSPITALIST

## 2023-06-25 PROCEDURE — 97116 GAIT TRAINING THERAPY: CPT | Mod: GP

## 2023-06-25 PROCEDURE — 250N000013 HC RX MED GY IP 250 OP 250 PS 637: Performed by: PHYSICIAN ASSISTANT

## 2023-06-25 PROCEDURE — 82248 BILIRUBIN DIRECT: CPT | Performed by: HOSPITALIST

## 2023-06-25 PROCEDURE — 250N000012 HC RX MED GY IP 250 OP 636 PS 637: Performed by: HOSPITALIST

## 2023-06-25 RX ORDER — ENOXAPARIN SODIUM 100 MG/ML
40 INJECTION SUBCUTANEOUS EVERY 24 HOURS
Status: DISCONTINUED | OUTPATIENT
Start: 2023-06-25 | End: 2023-06-29 | Stop reason: HOSPADM

## 2023-06-25 RX ORDER — MAGNESIUM SULFATE HEPTAHYDRATE 40 MG/ML
4 INJECTION, SOLUTION INTRAVENOUS ONCE
Status: COMPLETED | OUTPATIENT
Start: 2023-06-25 | End: 2023-06-25

## 2023-06-25 RX ADMIN — MAGNESIUM SULFATE HEPTAHYDRATE 4 G: 40 INJECTION, SOLUTION INTRAVENOUS at 08:00

## 2023-06-25 RX ADMIN — GABAPENTIN 600 MG: 300 CAPSULE ORAL at 07:55

## 2023-06-25 RX ADMIN — THIAMINE HYDROCHLORIDE 100 MG: 100 INJECTION, SOLUTION INTRAMUSCULAR; INTRAVENOUS at 08:05

## 2023-06-25 RX ADMIN — ENOXAPARIN SODIUM 40 MG: 100 INJECTION SUBCUTANEOUS at 15:39

## 2023-06-25 RX ADMIN — FOLIC ACID 1 MG: 1 TABLET ORAL at 07:55

## 2023-06-25 RX ADMIN — PREDNISOLONE ORAL 40 MG: 15 SOLUTION ORAL at 07:55

## 2023-06-25 RX ADMIN — DIAZEPAM 10 MG: 5 TABLET ORAL at 03:02

## 2023-06-25 RX ADMIN — MULTIPLE VITAMINS W/ MINERALS TAB 1 TABLET: TAB at 07:56

## 2023-06-25 ASSESSMENT — ACTIVITIES OF DAILY LIVING (ADL)
ADLS_ACUITY_SCORE: 32
ADLS_ACUITY_SCORE: 31
ADLS_ACUITY_SCORE: 34
ADLS_ACUITY_SCORE: 33
ADLS_ACUITY_SCORE: 34
ADLS_ACUITY_SCORE: 32
ADLS_ACUITY_SCORE: 34
ADLS_ACUITY_SCORE: 33
ADLS_ACUITY_SCORE: 33
ADLS_ACUITY_SCORE: 32

## 2023-06-25 NOTE — PROGRESS NOTES
Care Management:    The Pt was admitted from [facility].  Per the discussion in Interdisciplinary Rounds, Pt is tremulous.  He is currently assist of 1 and walker.  Per MD, he will be ready to return to [facility] in 1-2 days.    Met with the Pt.  Pt states, he has [employer] Insurance.  He does not have an insurance card at the hospital.  Referral placed for [facility]iko M[facility]quez -[phone].    Care Management will continue to monitor through daily chart reviews and Interdisciplinary Rounds.     Plan:  Return to [facility]      Génesis Ramirez RN, Care Coordinator [phone]

## 2023-06-25 NOTE — PROGRESS NOTES
End Of Shift Note    Situation: admitted for alcohol withdrawal, from [facility]    Plan: plan to return to [facility] when patient is discharged.    Subjective/Objective:    Neuro: A&OX 4, following commands, using call light appropriately, does have very small tremor in bilaterally arms/hands when trying to hold a glass to drink.     Cardiac: apical pulse regular, denies chest pain, chest tightness, or chest pressure.     Resp: lung sounds clear bilaterally, denies pain with deep breathes.     GI/: voiding spontaneously.    MSK: assisted to the bathroom with 1 assist, gait belt, and walker, patient has wide gait stance, does stop far away from the sink and bends forward (almost off balance) to wash hands.  Does need verbal cues to hang on to the walker when turning around to sit in the chair and not to sit down until he feels the chair with the backs of his legs.     Skin: jaundice,     LDAs:PIV SLX1

## 2023-06-25 NOTE — PLAN OF CARE
Pt has sometimes used his call light appropriately for assistance to get up to ambulate into the bathroom; other times he has been impulsive trying to get out of bed himself. He prefers to void in the toilet rather than use the urinal. He has had one small formed stool this evening. His gait remains wobbly, but he is able to ambulate w/ 1 assist, gait belt & walker. He has required 10mg Valium once for a CIWA of 8 due to agitation, otherwise his scores have been 3.. He has voiced frustration with our safety precautions ie: use of alarms, gait belt, slipper socks, walker, within arms reach. Acknowledged his feelings of frustration & reiterated the importance of the safety restrictions until he is able to be more steady on his feet.

## 2023-06-25 NOTE — PROGRESS NOTES
[facility]    Medicine Progress Note - Hospitalist Service    Date of Admission:  6/21/2023    Assessment & Plan   48 year old male with history of heavy alcohol use admitted on 6/21/2023. He presented to the ER from [facility] for evaluation of alcohol withdrawal and was subsequently admitted.  Started on Valium with relatively good control of his symptoms and high-dose IV thiamine due to some ataxia, although there was lower suspicion for Wernicke's.    Labs notable for hypokalemia, hypomagnesemia, and hypophosphatemia which were repleted.  These were serially monitored given risk of refeeding syndrome and required repletion.    Labs also showed a transaminitis likely due to chronic alcohol use, with abdominal ultrasound noting enlarged fatty liver, without evidence of cirrhosis.  Additional testing was sent off to assess for underlying liver disease.  Patient was started on prednisolone on 6/23 due to uptrending LFTs and elevated MDF.    Seen by PT/OT, was noted to be significantly deconditioned requiring moderate assistance.    -Continue prednisolone for alcoholic hepatitis, LFTs downtrending.  Awaiting for improvement of functional status, if not improved by tomorrow can consider TCU referrals prior to returning [facility] ED.    Alcohol withdrawal syndrome with perceptual disturbance (Resolved)  Was sent to the emergency department from [facility] for alcohol withdrawal that was not well controlled there. Drinks about 850 mL vodka daily, last drink on 6/18/23. Has never been through treatment before but has withdrawn, no history of seizures.   -Stop CIWA as does not appear to show signs of withdrawal  -Stop gabapentin Taper to avoid extra neuro suppressive effects  -MVI, Folate  -Thiamine 100 mg IV Daily and then p.o. daily after    Transaminitis  Alcoholic Hepatitis  Fatty Liver  Presented with alcohol pattern LFTs (ALT 44, ), elevated total  "bilirubin (7.2), elevated INR (1.52)Hepatomegaly and scleral icterus noted on exam. Abdominal ultrasound demonstrates \"1.  Mildly enlarged fatty liver. 2.  Presumed tumefactive sludge within the gallbladder lumen. Recommend short-term ultrasound follow-up in 8-12 weeks. No cholelithiasis. 3.  Trace ascites.\" Presumably has alcoholic liver disease, although cannot exclude other etiologies.   Work Up: Viral Hepatitis Panel negative, Smooth muscle Ab and JESSICA negative, Transferrin 108 (low)    -Bilirubin peaked and downtrending  -Prednisolone 40 mg daily started on 6/23 as MDF 45  -Recommend repeat abdominal US in 2-3 months    Generalized weakness  Patient notably weak and requiring moderate assistance.  Also appears to have intentional tremor when trying to do things, likely due to cerebellar dysfunction due to chronic alcohol use  -PT/OT consulted and, appreciate recs -working with PT/OT, if still requiring a lot of assistance by 6/26 would likely refer to TCU prior to return to [facility]    At risk for Refeeding Syndrome  -Continue monitor electrolytes and replete as needed    Normocytic anemia  Thrombocytopenia (Resolved)  Admit platelets 96.   Iron studies show low iron (54) and low TIBC, (141), normal iron saturation index (38), transferrin 108.   Likely anemia chronic disease and thrombocytopenia due to alcohol use  -Continue to monitor intermittently  -Consider checking ferritin as outpatient once stable and no active inflammation    Resolved Problems  Hypokalemia  Hypomagnesemia   Hypophosphatemia   Admit K = 2.7 / Mg = 1.4 / Phos = 1.3. Likely due to poor intake other than alcohol, is at risk for refeeding syndrome.       Diet: Combination Diet Regular Diet Adult    DVT Prophylaxis: Lovenox  Ferro Catheter: Not present  Lines: None     Cardiac Monitoring: None  Code Status: Full Code      Clinically Significant Risk Factors            # Hypomagnesemia: Lowest Mg = 1.6 mg/dL in last 2 days, will replace as " needed   # Hypoalbuminemia: Lowest albumin = 2.9 g/dL at 6/25/2023  5:31 AM, will monitor as appropriate  # Coagulation Defect: INR = 1.73 (Ref range: 0.85 - 1.15) and/or PTT = N/A, will monitor for bleeding                    Disposition Plan     Expected Discharge Date: 06/25/2023                  Denver Saeed MD  Hospitalist Service  [facility]  Securely message with [employer] (more info)  Text page via [employer] Paging/Directory   ______________________________________________________________________    Interval History   No acute events overnight.     Patient seen and evaluated at bedside.  Continues to feel a bit better today but still fairly weak.  Spoke to him about need for TCU if he continues to be weak which he is okay with.  Denies chest pain, shortness of breath, or abdominal pain.    Physical Exam   Vital Signs: Temp: 98.1  F (36.7  C) Temp src: Oral BP: 90/63 Pulse: 107   Resp: 16 SpO2: 94 % O2 Device: None (Room air)    Weight: 154 lbs 12.21 oz    General: NAD  CV: +S1/S2, no pitting edema  Respiratory: CTA BL  GI: soft, distended, NT, no guarding  MSK: tremor with movement  Neuro: alert    Medical Decision Making               Data     I have personally reviewed the following data over the past 24 hrs:    10.6  \   11.5 (L)   / 218     134 (L) 97 (L) 8.7 /  91   3.6 31 (H) 0.60 (L) \       ALT: 58 AST: 120 (H) AP: 235 (H) TBILI: 6.6 (H)   ALB: 2.9 (L) TOT PROTEIN: 5.3 (L) LIPASE: N/A       Imaging results reviewed over the past 24 hrs:   No results found for this or any previous visit (from the past 24 hour(s)).

## 2023-06-25 NOTE — PLAN OF CARE
Patient has been alert, oriented and pleasant with staff. Slightly tremulous, but improved dexterity with being able to open up packages. Given shower and able to wash himself without any assist of nursing assistant. Appetite improving. Ambulation ability and strength improved.

## 2023-06-26 ENCOUNTER — APPOINTMENT (OUTPATIENT)
Dept: OCCUPATIONAL THERAPY | Facility: CLINIC | Age: 48
End: 2023-06-26
Payer: COMMERCIAL

## 2023-06-26 LAB
ALBUMIN SERPL BCG-MCNC: 3.3 G/DL (ref 3.5–5.2)
ALP SERPL-CCNC: 230 U/L (ref 40–129)
ALT SERPL W P-5'-P-CCNC: 65 U/L (ref 0–70)
ANION GAP SERPL CALCULATED.3IONS-SCNC: 7 MMOL/L (ref 7–15)
AST SERPL W P-5'-P-CCNC: 110 U/L (ref 0–45)
BILIRUB DIRECT SERPL-MCNC: 3.96 MG/DL (ref 0–0.3)
BILIRUB SERPL-MCNC: 5.7 MG/DL
BUN SERPL-MCNC: 8.6 MG/DL (ref 6–20)
CALCIUM SERPL-MCNC: 8.9 MG/DL (ref 8.6–10)
CHLORIDE SERPL-SCNC: 97 MMOL/L (ref 98–107)
CREAT SERPL-MCNC: 0.6 MG/DL (ref 0.67–1.17)
DEPRECATED HCO3 PLAS-SCNC: 32 MMOL/L (ref 22–29)
ERYTHROCYTE [DISTWIDTH] IN BLOOD BY AUTOMATED COUNT: 15.5 % (ref 10–15)
GFR SERPL CREATININE-BSD FRML MDRD: >90 ML/MIN/1.73M2
GLUCOSE SERPL-MCNC: 79 MG/DL (ref 70–99)
HCT VFR BLD AUTO: 37.1 % (ref 40–53)
HGB BLD-MCNC: 12.4 G/DL (ref 13.3–17.7)
MAGNESIUM SERPL-MCNC: 1.8 MG/DL (ref 1.7–2.3)
MCH RBC QN AUTO: 33 PG (ref 26.5–33)
MCHC RBC AUTO-ENTMCNC: 33.4 G/DL (ref 31.5–36.5)
MCV RBC AUTO: 99 FL (ref 78–100)
PHOSPHATE SERPL-MCNC: 2.9 MG/DL (ref 2.5–4.5)
PLATELET # BLD AUTO: 265 10E3/UL (ref 150–450)
POTASSIUM SERPL-SCNC: 4.1 MMOL/L (ref 3.4–5.3)
PROT SERPL-MCNC: 5.8 G/DL (ref 6.4–8.3)
RBC # BLD AUTO: 3.76 10E6/UL (ref 4.4–5.9)
SODIUM SERPL-SCNC: 136 MMOL/L (ref 136–145)
WBC # BLD AUTO: 11.1 10E3/UL (ref 4–11)

## 2023-06-26 PROCEDURE — 84100 ASSAY OF PHOSPHORUS: CPT | Performed by: HOSPITALIST

## 2023-06-26 PROCEDURE — 85027 COMPLETE CBC AUTOMATED: CPT | Performed by: HOSPITALIST

## 2023-06-26 PROCEDURE — 120N000001 HC R&B MED SURG/OB

## 2023-06-26 PROCEDURE — 82310 ASSAY OF CALCIUM: CPT | Performed by: HOSPITALIST

## 2023-06-26 PROCEDURE — 250N000012 HC RX MED GY IP 250 OP 636 PS 637: Performed by: HOSPITALIST

## 2023-06-26 PROCEDURE — 99232 SBSQ HOSP IP/OBS MODERATE 35: CPT

## 2023-06-26 PROCEDURE — 36415 COLL VENOUS BLD VENIPUNCTURE: CPT | Performed by: HOSPITALIST

## 2023-06-26 PROCEDURE — 82248 BILIRUBIN DIRECT: CPT | Performed by: HOSPITALIST

## 2023-06-26 PROCEDURE — 250N000011 HC RX IP 250 OP 636: Mod: JZ | Performed by: PHYSICIAN ASSISTANT

## 2023-06-26 PROCEDURE — 97530 THERAPEUTIC ACTIVITIES: CPT | Mod: GO

## 2023-06-26 PROCEDURE — 250N000013 HC RX MED GY IP 250 OP 250 PS 637: Performed by: PHYSICIAN ASSISTANT

## 2023-06-26 PROCEDURE — 250N000011 HC RX IP 250 OP 636: Performed by: HOSPITALIST

## 2023-06-26 PROCEDURE — 83735 ASSAY OF MAGNESIUM: CPT | Performed by: HOSPITALIST

## 2023-06-26 RX ADMIN — MULTIPLE VITAMINS W/ MINERALS TAB 1 TABLET: TAB at 08:01

## 2023-06-26 RX ADMIN — THIAMINE HYDROCHLORIDE 100 MG: 100 INJECTION, SOLUTION INTRAMUSCULAR; INTRAVENOUS at 08:01

## 2023-06-26 RX ADMIN — ONDANSETRON 4 MG: 4 TABLET, ORALLY DISINTEGRATING ORAL at 08:23

## 2023-06-26 RX ADMIN — FOLIC ACID 1 MG: 1 TABLET ORAL at 08:01

## 2023-06-26 RX ADMIN — ACETAMINOPHEN 650 MG: 325 TABLET, FILM COATED ORAL at 08:23

## 2023-06-26 RX ADMIN — ONDANSETRON 4 MG: 2 INJECTION INTRAMUSCULAR; INTRAVENOUS at 01:12

## 2023-06-26 RX ADMIN — PREDNISOLONE ORAL 40 MG: 15 SOLUTION ORAL at 08:01

## 2023-06-26 RX ADMIN — ENOXAPARIN SODIUM 40 MG: 100 INJECTION SUBCUTANEOUS at 17:15

## 2023-06-26 ASSESSMENT — ACTIVITIES OF DAILY LIVING (ADL)
ADLS_ACUITY_SCORE: 33
ADLS_ACUITY_SCORE: 32
ADLS_ACUITY_SCORE: 33
ADLS_ACUITY_SCORE: 32

## 2023-06-26 NOTE — PROGRESS NOTES
"[facility] Medicine Progress Note  Date of Service: 06/26/2023    Assessment & Plan   48 year old male with history of heavy alcohol use admitted on 6/21/2023. He presented to the ER from [facility] for evaluation of alcohol withdrawal and was subsequently admitted.  Started on Valium with relatively good control of his symptoms and high-dose IV thiamine due to some ataxia, although there was lower suspicion for Wernicke's. Labs also showed a transaminitis likely due to chronic alcohol use, with abdominal ultrasound noting enlarged fatty liver, without evidence of cirrhosis.  Additional testing was sent off to assess for underlying liver disease.  Patient was started on prednisolone on 6/23 due to uptrending LFTs and elevated MDF.     Alcohol withdrawal syndrome with perceptual disturbance (Resolved)  Was sent to the emergency department from [facility] for alcohol withdrawal that was not well controlled there. Drinks about 850 mL vodka daily, last drink on 6/18/23. Has never been through treatment before but has withdrawn, no history of seizures. CIWA stopped 6/25. No gabapentin taper to avoid extra neuro suppressive effects  -MVI, Folate  -Thiamine 100 mg IV Daily and then p.o. daily after     Transaminitis  Alcoholic Hepatitis  Fatty Liver  LFTs with alcohol pattern, stable (ALT 44 --> 65,  --> 110), elevated total bilirubin (7.2 --> 5.7), elevated INR (1.52 --> 1.73). Abdominal ultrasound demonstrates \"1.  Mildly enlarged fatty liver. 2.  Presumed tumefactive sludge within the gallbladder lumen. Recommend short-term ultrasound follow-up in 8-12 weeks. No cholelithiasis. 3.  Trace ascites.\" Presumably has alcoholic liver disease (viral Hepatitis Panel negative, Smooth muscle Ab and JESSICA negative, Transferrin 108 (low).  -Prednisolone 40 mg daily started on 6/23 as MDF 45  -Recommend repeat abdominal US in 2-3 months     Generalized " weakness  Tremor  Notably weak and requiring moderate assistance. Intentional tremor significantly impacting ability to eat & ambulate, likely due to cerebellar dysfunction due to chronic alcohol use. Still requiring assistance to ambulate 6/26, but motivated to continue working at it and is slowly improving.   -PT/OT consulted, appreciate recs      Normocytic anemia  Thrombocytopenia (Resolved)  Admit platelets 96, improved to WNL (265). Iron studies indicate anemia of chronic disease due to alcohol use (low iron (54), low TIBC, (141), normal iron sat (38), transferrin 108).   -Continue to monitor intermittently  -Consider checking ferritin as outpatient once stable and no active inflammation     Hypokalemia  Hypomagnesemia   Hypophosphatemia   Admit K = 2.7 / Mg = 1.4 / Phos = 1.3. Likely due to poor intake other than alcohol, improving with repletion, diet, & cessation of ETOH while in hospital.     Diet: Combination Diet Regular Diet Adult    DVT Prophylaxis: Pneumatic Compression Devices  Ferro Catheter: Not present  Lines: None     Code Status: Full Code       Discussion: patient continues to improve & is motivated but cannot ambulate independently due to unsteadiness and tremor.     Disposition: Anticipate discharge 6/27/23 pending continued improvement & ability to ambulate independently.     Attestation:  I have reviewed today's vital signs, notes, medications, labs and imaging.    I have discussed, or will be discussing, the patient with hospitalist physician, Dr. Neumann.     Rohini Raygoza PA-C     Interval History   Doing ok, had some abdominal pain this AM which is generalized and mild. Denies nausea or vomiting. Trying to force down some food but says he has no appetite. Denies chest pain, discomfort, dyspnea, coughing. Still has tremor with rest and activity but says this is improving. Still feels very unsteady on his feet, says it is worse with initiating standing and once he gets moving he  feels better. Cannot ambulate without walker yet. Endorses some tingling in his toes but says this is unchanged & has been going on for the past few months. Denies any numbness, denies extension of sensation past toes or up into leg. Feels this is not impacting his steadiness on his feet.     Physical Exam   Temp:  [97.5  F (36.4  C)-98.7  F (37.1  C)] 98.4  F (36.9  C)  Pulse:  [62-90] 68  Resp:  [18] 18  BP: (103-140)/(73-89) 108/74  SpO2:  [97 %-99 %] 98 %    Weights:   Vitals:    06/24/23 0645 06/25/23 0521 06/26/23 0403   Weight: 68.6 kg (151 lb 3.8 oz) 70.2 kg (154 lb 12.2 oz) 69.5 kg (153 lb 3.5 oz)    Body mass index is 20.78 kg/m .    Constitutional: alert and oriented x3, laying in bed, appears mildly uncomfortable, nontoxic  Eyes: EOMs intact. Mild scleral icterus.   CV: regular rate & rhythm, no murmurs, rubs, or gallops. Radial & dorsalis pedis pulses 2+ bilaterally. Trace LE edema bilateral feet.   Respiratory: CTA bilaterally, respirations unlabored on room air.   GI: Bowel sounds present throughout. Abdomen distended & firm but not hard with regular bowel sounds & no tenderness to palpation.   Skin: Warm and dry, no jaundice.   Musculoskeletal: muscle bulk as expected, no obvious joint deformities  Neuro: distal sensation intact to lower extremities bilaterally. Tremor present at rest and with movement.     Data   Recent Labs   Lab 06/26/23  0552 06/25/23  0531 06/24/23  0523 06/23/23  1144 06/21/23  2207 06/21/23  1256 06/21/23  1048   WBC 11.1* 10.6 8.1  --    < >  --  10.6   HGB 12.4* 11.5* 11.4*  --    < >  --  11.2*   MCV 99 97 99  --    < >  --  92    218 177  --    < >  --  96*   INR  --   --  1.73* 1.78*  --  1.52*  --     134* 132*  --    < >  --  131*   POTASSIUM 4.1 3.6 4.2 3.9   < >  --  2.7*   CHLORIDE 97* 97* 96*  --    < >  --  89*   CO2 32* 31* 29  --    < >  --  27   BUN 8.6 8.7 9.9  --    < >  --  7.2   CR 0.60* 0.60* 0.52*  --    < >  --  0.59*   ANIONGAP 7 6* 7  --     < >  --  15   MAYNOR 8.9 8.8 8.7  --    < >  --  9.0   GLC 79 91 138*  --    < >  --  118*   ALBUMIN 3.3* 2.9* 3.1*  --    < >  --  3.7   PROTTOTAL 5.8* 5.3* 5.4*  --    < >  --  6.1*   BILITOTAL 5.7* 6.6* 9.7*  --    < >  --  7.2*   ALKPHOS 230* 235* 226*  --    < >  --  210*   ALT 65 58 59  --    < >  --  44   * 120* 161*  --    < >  --  176*   LIPASE  --   --   --   --   --   --  67*    < > = values in this interval not displayed.     I reviewed all new labs and imaging results over the last 24 hours.     Medications       enoxaparin ANTICOAGULANT  40 mg Subcutaneous Q24H     folic acid  1 mg Oral Daily     multivitamin w/minerals  1 tablet Oral Daily     prednisoLONE  40 mg Oral Daily     sodium chloride (PF)  3 mL Intracatheter Q8H     [START ON 6/27/2023] thiamine  100 mg Oral Daily     Rohini Raygoza PA-C

## 2023-06-26 NOTE — PLAN OF CARE
Pt very motivated to regain his independence. Using call light appropriately most of the night. Sets off bed alarm when he feels an urgency to get to the bathroom. He ambulated in the halls w/ NA greater than 300 feet using a walker & gait belt. He is still a little shaky on his feet, but much improved since yesterday. NA supervised pt in BR as he was able to change his own brief, then go over to bed & straighten his linen while holding one hand on the walker. Minimal tremor noted in hands when doing fine motor movements. Pt c/o nausea @ 0112, given IV Zofran with some relief.

## 2023-06-26 NOTE — PROGRESS NOTES
Report given to mother Cleopatra, Pt up and ambulating in hallway with gait belt and walker assist of 1, unsteady at times however, improvement over yesterday, ataxic and tremors noted, pt determined to be as independent as possible, uses call light, on room air, IV saline locked and flushed, c/o lower abdominal discomfort this morning, poor appetite this AM, medications given per MAR, ginger ale and warm pack for comfort, Ate well for lunch, able to make needs known, Report also given to BENNETT Dill from [facility]. /74 (BP Location: Right arm)   Pulse 68   Temp 98.4  F (36.9  C) (Oral)   Resp 18   Ht 1.829 m (6')   Wt 69.5 kg (153 lb 3.5 oz)   SpO2 98%   BMI 20.78 kg/m

## 2023-06-27 ENCOUNTER — APPOINTMENT (OUTPATIENT)
Dept: PHYSICAL THERAPY | Facility: CLINIC | Age: 48
End: 2023-06-27
Payer: COMMERCIAL

## 2023-06-27 ENCOUNTER — APPOINTMENT (OUTPATIENT)
Dept: OCCUPATIONAL THERAPY | Facility: CLINIC | Age: 48
End: 2023-06-27
Payer: COMMERCIAL

## 2023-06-27 LAB
ALBUMIN SERPL BCG-MCNC: 3.2 G/DL (ref 3.5–5.2)
ALP SERPL-CCNC: 198 U/L (ref 40–129)
ALT SERPL W P-5'-P-CCNC: 58 U/L (ref 0–70)
ANION GAP SERPL CALCULATED.3IONS-SCNC: 8 MMOL/L (ref 7–15)
AST SERPL W P-5'-P-CCNC: 89 U/L (ref 0–45)
BILIRUB DIRECT SERPL-MCNC: 3 MG/DL (ref 0–0.3)
BILIRUB SERPL-MCNC: 4.4 MG/DL
BUN SERPL-MCNC: 8.9 MG/DL (ref 6–20)
CALCIUM SERPL-MCNC: 8.7 MG/DL (ref 8.6–10)
CHLORIDE SERPL-SCNC: 99 MMOL/L (ref 98–107)
CREAT SERPL-MCNC: 0.66 MG/DL (ref 0.67–1.17)
DEPRECATED HCO3 PLAS-SCNC: 31 MMOL/L (ref 22–29)
ERYTHROCYTE [DISTWIDTH] IN BLOOD BY AUTOMATED COUNT: 15.3 % (ref 10–15)
GFR SERPL CREATININE-BSD FRML MDRD: >90 ML/MIN/1.73M2
GLUCOSE SERPL-MCNC: 85 MG/DL (ref 70–99)
HCT VFR BLD AUTO: 33.1 % (ref 40–53)
HGB BLD-MCNC: 11.5 G/DL (ref 13.3–17.7)
HOLD SPECIMEN: NORMAL
MAGNESIUM SERPL-MCNC: 1.7 MG/DL (ref 1.7–2.3)
MCH RBC QN AUTO: 33.6 PG (ref 26.5–33)
MCHC RBC AUTO-ENTMCNC: 34.7 G/DL (ref 31.5–36.5)
MCV RBC AUTO: 97 FL (ref 78–100)
PHOSPHATE SERPL-MCNC: 3.3 MG/DL (ref 2.5–4.5)
PLATELET # BLD AUTO: 254 10E3/UL (ref 150–450)
POTASSIUM SERPL-SCNC: 3.7 MMOL/L (ref 3.4–5.3)
PROT SERPL-MCNC: 5.3 G/DL (ref 6.4–8.3)
RBC # BLD AUTO: 3.42 10E6/UL (ref 4.4–5.9)
SODIUM SERPL-SCNC: 138 MMOL/L (ref 136–145)
WBC # BLD AUTO: 12.2 10E3/UL (ref 4–11)

## 2023-06-27 PROCEDURE — 85027 COMPLETE CBC AUTOMATED: CPT

## 2023-06-27 PROCEDURE — 83735 ASSAY OF MAGNESIUM: CPT | Performed by: HOSPITALIST

## 2023-06-27 PROCEDURE — 250N000013 HC RX MED GY IP 250 OP 250 PS 637: Performed by: INTERNAL MEDICINE

## 2023-06-27 PROCEDURE — 36415 COLL VENOUS BLD VENIPUNCTURE: CPT

## 2023-06-27 PROCEDURE — 99207 PR APP CREDIT; MD BILLING SHARED VISIT: CPT

## 2023-06-27 PROCEDURE — 120N000001 HC R&B MED SURG/OB

## 2023-06-27 PROCEDURE — 250N000011 HC RX IP 250 OP 636: Mod: JZ | Performed by: HOSPITALIST

## 2023-06-27 PROCEDURE — 99232 SBSQ HOSP IP/OBS MODERATE 35: CPT | Mod: FS | Performed by: INTERNAL MEDICINE

## 2023-06-27 PROCEDURE — 82310 ASSAY OF CALCIUM: CPT

## 2023-06-27 PROCEDURE — 250N000013 HC RX MED GY IP 250 OP 250 PS 637: Performed by: HOSPITALIST

## 2023-06-27 PROCEDURE — 97116 GAIT TRAINING THERAPY: CPT | Mod: GP | Performed by: PHYSICAL THERAPIST

## 2023-06-27 PROCEDURE — 250N000013 HC RX MED GY IP 250 OP 250 PS 637: Performed by: PHYSICIAN ASSISTANT

## 2023-06-27 PROCEDURE — 84100 ASSAY OF PHOSPHORUS: CPT | Performed by: HOSPITALIST

## 2023-06-27 PROCEDURE — 97530 THERAPEUTIC ACTIVITIES: CPT | Mod: GO

## 2023-06-27 PROCEDURE — 250N000012 HC RX MED GY IP 250 OP 636 PS 637: Performed by: HOSPITALIST

## 2023-06-27 PROCEDURE — 82248 BILIRUBIN DIRECT: CPT | Performed by: HOSPITALIST

## 2023-06-27 RX ORDER — SIMETHICONE 80 MG
80 TABLET,CHEWABLE ORAL EVERY 6 HOURS PRN
Status: DISCONTINUED | OUTPATIENT
Start: 2023-06-27 | End: 2023-06-29 | Stop reason: HOSPADM

## 2023-06-27 RX ADMIN — PREDNISOLONE ORAL 40 MG: 15 SOLUTION ORAL at 08:00

## 2023-06-27 RX ADMIN — SIMETHICONE 80 MG: 80 TABLET, CHEWABLE ORAL at 22:25

## 2023-06-27 RX ADMIN — ENOXAPARIN SODIUM 40 MG: 100 INJECTION SUBCUTANEOUS at 14:22

## 2023-06-27 RX ADMIN — MULTIPLE VITAMINS W/ MINERALS TAB 1 TABLET: TAB at 08:00

## 2023-06-27 RX ADMIN — FOLIC ACID 1 MG: 1 TABLET ORAL at 08:00

## 2023-06-27 RX ADMIN — THIAMINE HCL TAB 100 MG 100 MG: 100 TAB at 08:00

## 2023-06-27 ASSESSMENT — ACTIVITIES OF DAILY LIVING (ADL)
ADLS_ACUITY_SCORE: 30
ADLS_ACUITY_SCORE: 32
ADLS_ACUITY_SCORE: 32
ADLS_ACUITY_SCORE: 30
ADLS_ACUITY_SCORE: 32
ADLS_ACUITY_SCORE: 31
ADLS_ACUITY_SCORE: 32
ADLS_ACUITY_SCORE: 29
ADLS_ACUITY_SCORE: 31
ADLS_ACUITY_SCORE: 31
ADLS_ACUITY_SCORE: 32
ADLS_ACUITY_SCORE: 29

## 2023-06-27 NOTE — PROGRESS NOTES
Goal Outcome Evaluation:      Neuro: a&o X4  Cardiac: WDL  Respiratory: WDL  GI/: WDL  Diet/appetite: Eating well, went to vending machine for snacks and soda  Activity: walked in ferreira multiple times with assist of 1 and walker. Slight tremors and weakness but improving (per patient)  Pain: Denies pain  Skin: Small abrasion/scab on right knee  LDA's: IV in right ac

## 2023-06-27 NOTE — PLAN OF CARE
Alert and oriented. Tremor present. Up to bathroom with standby assist. Refused to wear anti slip socks when offered, but then walked barefoot to the other side of his room to get his slip on shoes. He stated he needed to start doing more stuff on his own or he'd never get out of here. He then walked to the bathroom without using the walker. No c/o nausea. IV saline locked.

## 2023-06-27 NOTE — PROGRESS NOTES
"[facility] Medicine Progress Note  Date of Service: 06/27/2023    Assessment & Plan   48 year old male with history of heavy alcohol use admitted on 6/21/2023. He presented to the ER from [facility] for evaluation of alcohol withdrawal and was subsequently admitted. Patient was started on prednisolone on 6/23 due to uptrending LFTs and elevated MDF.     Alcohol withdrawal syndrome with perceptual disturbance (Resolved)  Was sent to the emergency department from [facility] for alcohol withdrawal that was not well controlled there. Drinks about 850 mL vodka daily, last drink on 6/18/23. Has never been through treatment before but has withdrawn, no history of seizures. CIWA stopped 6/25. No gabapentin taper to avoid extra neuro suppressive effects  -Continue multivitamin, folate, thiamine (initially received IV thiamine, now on PO protocol)     Transaminitis  Alcoholic Hepatitis  Fatty Liver  LFTs with alcohol pattern are stable (ALT 44 --> 58,  --> 89), elevated total bilirubin (7.2 --> 4.4), elevated INR (1.52 --> 1.73). Abdominal ultrasound demonstrates \"1.  Mildly enlarged fatty liver. 2.  Presumed tumefactive sludge within the gallbladder lumen. Recommend short-term ultrasound follow-up in 8-12 weeks. No cholelithiasis. 3.  Trace ascites.\" Presumably has alcoholic liver disease (viral Hepatitis Panel negative, Smooth muscle Ab and JESSICA negative, Transferrin 108 (low).  -Prednisolone 40 mg daily started on 6/23 as MDF 45  -Will need recheck labs (albumin, creatinine, PT, bilirubin) on 6/30 to calculate Lille score & assess for continued response to steroids  -Recommend repeat abdominal US in 2-3 months     Generalized weakness  Tremor  Notably weak and requiring moderate assistance on admission, now ambulating independently in room 6/27 but still requiring walker to ambulate in halls. Intentional tremor present likely due to cerebellar " dysfunction due to chronic alcohol use.   -PT/OT consulted, appreciate recs      Normocytic anemia  Thrombocytopenia (Resolved)  Admit platelets 96, improved to WNL (around 250). Iron studies indicate anemia of chronic disease due to alcohol use (low iron (54), low TIBC, (141), normal iron sat (38), transferrin 108).   -Continue to monitor intermittently     Leukocytosis  Mild, most likely due to initiation of prednisolone as developed after starting steroids & patient has no other signs or symptoms of active infection. Monitor intermittently.      Hypokalemia  Hypomagnesemia   Hypophosphatemia   Admit K = 2.7 / Mg = 1.4 / Phos = 1.3. Likely due to poor intake other than alcohol, improving with repletion, diet, & cessation of ETOH while in hospital.      Diet: Combination Diet Regular Diet Adult    DVT Prophylaxis: Pneumatic Compression Devices  Ferro Catheter: Not present  Lines: None     Code Status: Full Code       Discussion: Slowly improving. Eating better today and continues to become more stable & independent. Not quite ambulating without walker outside room today but getting close.     Disposition: Anticipate discharge hopefully tomorrow pending continued improvement in stability & ability to walk independently.     Attestation:  I have reviewed today's vital signs, notes, medications, labs and imaging.    I have discussed, or will be discussing, the patient with hospitalist physician, Dr. Neumann.     Rohini Raygoza PA-C     Interval History   Still tremulous and still unsteady with ambulating in hallway but feels confident walking independently in his room. Continues to improve slowly. Nausea improved today. No chest pain, dyspnea, abdominal pain. Patient denies chest pain, cough,  changes in hearing or vision, new weakness, sore throat, or other pain.     Physical Exam   Temp:  [97.9  F (36.6  C)-98.5  F (36.9  C)] 98.3  F (36.8  C)  Pulse:  [58-81] 81  Resp:  [16-18] 18  BP: ()/(66-84)  117/79  SpO2:  [97 %-99 %] 97 %    Weights:   Vitals:    06/25/23 0521 06/26/23 0403 06/27/23 0546   Weight: 70.2 kg (154 lb 12.2 oz) 69.5 kg (153 lb 3.5 oz) 71.9 kg (158 lb 8.2 oz)    Body mass index is 21.5 kg/m .    Constitutional: alert and oriented x3, laying in bed, appears comfortable, nontoxic  Eyes: EOMs intact, no scleral icterus but sclera do appear slightly muddy today.   CV: regular rate & rhythm, no murmurs, rubs, or gallops. Radial & dorsalis pedis pulses 2+ bilaterally. No LE edema.   Respiratory: CTA bilaterally, respirations unlabored on room air.   GI: Bowel sounds present throughout. Abdomen soft, nontender to palpation, nondistended.   Skin: Warm and dry. No jaundice.   Musculoskeletal: muscle bulk as expected. No obvious joint deformities.   Neuro: distal sensation intact to lower extremities bilaterally. Moderate intentional tremor but more stable than yesterday. Speech intact without slurring.     Data   Recent Labs   Lab 06/27/23  0521 06/26/23  0552 06/25/23  0531 06/24/23  0523 06/23/23  1144 06/21/23  2207 06/21/23  1256 06/21/23  1048   WBC 12.2* 11.1* 10.6 8.1  --    < >  --  10.6   HGB 11.5* 12.4* 11.5* 11.4*  --    < >  --  11.2*   MCV 97 99 97 99  --    < >  --  92    265 218 177  --    < >  --  96*   INR  --   --   --  1.73* 1.78*  --  1.52*  --     136 134* 132*  --    < >  --  131*   POTASSIUM 3.7 4.1 3.6 4.2 3.9   < >  --  2.7*   CHLORIDE 99 97* 97* 96*  --    < >  --  89*   CO2 31* 32* 31* 29  --    < >  --  27   BUN 8.9 8.6 8.7 9.9  --    < >  --  7.2   CR 0.66* 0.60* 0.60* 0.52*  --    < >  --  0.59*   ANIONGAP 8 7 6* 7  --    < >  --  15   MAYNOR 8.7 8.9 8.8 8.7  --    < >  --  9.0   GLC 85 79 91 138*  --    < >  --  118*   ALBUMIN 3.2* 3.3* 2.9* 3.1*  --    < >  --  3.7   PROTTOTAL 5.3* 5.8* 5.3* 5.4*  --    < >  --  6.1*   BILITOTAL 4.4* 5.7* 6.6* 9.7*  --    < >  --  7.2*   ALKPHOS 198* 230* 235* 226*  --    < >  --  210*   ALT 58 65 58 59  --    < >  --  44    AST 89* 110* 120* 161*  --    < >  --  176*   LIPASE  --   --   --   --   --   --   --  67*    < > = values in this interval not displayed.     I reviewed all new labs and imaging results over the last 24 hours.    Medications       enoxaparin ANTICOAGULANT  40 mg Subcutaneous Q24H     folic acid  1 mg Oral Daily     multivitamin w/minerals  1 tablet Oral Daily     prednisoLONE  40 mg Oral Daily     sodium chloride (PF)  3 mL Intracatheter Q8H     thiamine  100 mg Oral Daily     Rohini Raygoza PA-C

## 2023-06-27 NOTE — PLAN OF CARE
Patient no longer requiring alarms and can be independent in his room. Returned a call from [facility] and spoke with Missy who was wanting an update on his status. She was given update on today and the progress he has made throughout his stay, although she is aware that he is unable to carry a meal tray which is a requirement for return feels that he is getting very close to accomplishing that. She said that we can anticipate that he can return potentially tomorrow, but still had to update their physician. IV dc'd with order from PA as it is not being used. He is very pleasant with staff and being able to have some independence lessens his frustration.

## 2023-06-27 NOTE — PROGRESS NOTES
Clinical Nutrition Services- Brief Note    Reviewed nutrition risk factors due to LOS. Pt is tolerating a Regular diet, eating well per nursing documentation and patient report. No nutrition issues identified at this time. RD will continue to follow per nutrition protocol.    Bailey Alvarado RDN, ERIKA  Clinical Dietitian  Office: [phone]  Weekend pager: [phone]

## 2023-06-27 NOTE — PLAN OF CARE
"Radford a noise from patient's room and this writer and NST went into room to find him on his knees. Meal tray on floor next to him. He said \" I was just trying to practice carrying the tray.\" Was told earlier that he could be independent in his room after evaluation by therapy. Still needing to be assisted with gait belt out of room. Assisted to standing position and into chair. Alarm reset. Fall band in placed. Non skid slippers on. Right knee cleansed with microklenz, mipalex dressing placed and ice pack applied. Patient frustrated over needing to reimplement alarms. Keiko ANDRE updated. No new orders.                        "

## 2023-06-28 ENCOUNTER — APPOINTMENT (OUTPATIENT)
Dept: OCCUPATIONAL THERAPY | Facility: CLINIC | Age: 48
End: 2023-06-28
Payer: COMMERCIAL

## 2023-06-28 ENCOUNTER — APPOINTMENT (OUTPATIENT)
Dept: PHYSICAL THERAPY | Facility: CLINIC | Age: 48
End: 2023-06-28
Payer: COMMERCIAL

## 2023-06-28 LAB
MAGNESIUM SERPL-MCNC: 1.6 MG/DL (ref 1.7–2.3)
PHOSPHATE SERPL-MCNC: 4 MG/DL (ref 2.5–4.5)
PLATELET # BLD AUTO: 268 10E3/UL (ref 150–450)
POTASSIUM SERPL-SCNC: 3.7 MMOL/L (ref 3.4–5.3)
SARS-COV-2 RNA RESP QL NAA+PROBE: NEGATIVE

## 2023-06-28 PROCEDURE — 87635 SARS-COV-2 COVID-19 AMP PRB: CPT | Performed by: INTERNAL MEDICINE

## 2023-06-28 PROCEDURE — 99232 SBSQ HOSP IP/OBS MODERATE 35: CPT | Performed by: INTERNAL MEDICINE

## 2023-06-28 PROCEDURE — 84132 ASSAY OF SERUM POTASSIUM: CPT | Performed by: INTERNAL MEDICINE

## 2023-06-28 PROCEDURE — 250N000013 HC RX MED GY IP 250 OP 250 PS 637: Performed by: PHYSICIAN ASSISTANT

## 2023-06-28 PROCEDURE — 83735 ASSAY OF MAGNESIUM: CPT | Performed by: INTERNAL MEDICINE

## 2023-06-28 PROCEDURE — 250N000012 HC RX MED GY IP 250 OP 636 PS 637: Performed by: HOSPITALIST

## 2023-06-28 PROCEDURE — 250N000013 HC RX MED GY IP 250 OP 250 PS 637: Performed by: HOSPITALIST

## 2023-06-28 PROCEDURE — 250N000011 HC RX IP 250 OP 636: Mod: JZ | Performed by: HOSPITALIST

## 2023-06-28 PROCEDURE — 97116 GAIT TRAINING THERAPY: CPT | Mod: GP | Performed by: REHABILITATION PRACTITIONER

## 2023-06-28 PROCEDURE — 120N000001 HC R&B MED SURG/OB

## 2023-06-28 PROCEDURE — 85049 AUTOMATED PLATELET COUNT: CPT | Performed by: HOSPITALIST

## 2023-06-28 PROCEDURE — 36415 COLL VENOUS BLD VENIPUNCTURE: CPT | Performed by: INTERNAL MEDICINE

## 2023-06-28 PROCEDURE — 97530 THERAPEUTIC ACTIVITIES: CPT | Mod: GO

## 2023-06-28 PROCEDURE — 250N000013 HC RX MED GY IP 250 OP 250 PS 637: Performed by: INTERNAL MEDICINE

## 2023-06-28 PROCEDURE — 97535 SELF CARE MNGMENT TRAINING: CPT | Mod: GO

## 2023-06-28 PROCEDURE — 84100 ASSAY OF PHOSPHORUS: CPT | Performed by: INTERNAL MEDICINE

## 2023-06-28 PROCEDURE — 97110 THERAPEUTIC EXERCISES: CPT | Mod: GP | Performed by: REHABILITATION PRACTITIONER

## 2023-06-28 RX ADMIN — FOLIC ACID 1 MG: 1 TABLET ORAL at 08:51

## 2023-06-28 RX ADMIN — ENOXAPARIN SODIUM 40 MG: 100 INJECTION SUBCUTANEOUS at 14:19

## 2023-06-28 RX ADMIN — SIMETHICONE 80 MG: 80 TABLET, CHEWABLE ORAL at 18:18

## 2023-06-28 RX ADMIN — MULTIPLE VITAMINS W/ MINERALS TAB 1 TABLET: TAB at 08:52

## 2023-06-28 RX ADMIN — THIAMINE HCL TAB 100 MG 100 MG: 100 TAB at 08:52

## 2023-06-28 RX ADMIN — PREDNISOLONE ORAL 40 MG: 15 SOLUTION ORAL at 08:51

## 2023-06-28 ASSESSMENT — ACTIVITIES OF DAILY LIVING (ADL)
ADLS_ACUITY_SCORE: 32
ADLS_ACUITY_SCORE: 29
ADLS_ACUITY_SCORE: 32
ADLS_ACUITY_SCORE: 29
ADLS_ACUITY_SCORE: 32
ADLS_ACUITY_SCORE: 32
ADLS_ACUITY_SCORE: 29
ADLS_ACUITY_SCORE: 32

## 2023-06-28 NOTE — PROGRESS NOTES
Shift Time:  Activity: Up with SBA to walk in hallway. Patient walked 120 feet in hallway, steady on feet. Up in recliner , BLE elevated on two pillows.  GI: Gas discomfort, simethicone tablet given.  : No problem identified  Neuro:  Diet: Tolerating regular  Pain:Denies  Behavior:A/O cooperative  Plan for day/discharge: Plan to return to [facility] when able to safely ambulate per MD note.  /70 (BP Location: Left arm)   Pulse 76   Temp 98.6  F (37  C) (Oral)   Resp 18   Ht 1.829 m (6')   Wt 70.7 kg (155 lb 13.8 oz)   SpO2 99%   BMI 21.14 kg/m

## 2023-06-28 NOTE — PLAN OF CARE
Occupational Therapy Discharge Summary    Reason for therapy discharge:    All goals and outcomes met, no further needs identified.    Progress towards therapy goal(s). See goals on Care Plan in [facility] electronic health record for goal details.  Goals met    Therapy recommendation(s):    No further therapy is recommended.

## 2023-06-28 NOTE — PLAN OF CARE
"Patient has been more fatigued today than yesterday. Said he had gas discomfort last night, although he has been having BMs. Was given simethicone and said that was effective. This writer has had patient for several days and he was more \"perky\" than he is today. Likes to sit up in the chair all day and earlier this am he said he was too tired. Is now up in chair and reclining. Complaining of edema in feet and ankles. Elevated feet. Noted temperature to be 100.3 oral at 1054. Denies any respiratory or urinary symptoms. Had been incontinent of bowel and bladder earlier in his stay.Did page Dr. Poole regarding temperature. [facility] called and was given an update. Plan is to update them again tomorrow. Hoping that he will be able to go back soon to complete his treatment program there.                         "

## 2023-06-28 NOTE — PLAN OF CARE
Alert and oriented, using call light appropriately. Has been stand by assist with ambulating. Patient frustrated for the need to call to use the bathroom. Patient was agreeable to being allowed to walk straight to the bathroom from bed independently, but will call for any further walks. Patient had some complaints of gas build up and not being able to have a bowel movement. PRN Simethicone ordered per telemed with relief.

## 2023-06-28 NOTE — PROGRESS NOTES
"[facility]    Hospitalist Progress Note    Date of Service (when I saw the patient): 06/28/2023    Assessment & Plan   48 year old male with history of heavy alcohol use admitted on 6/21/2023. He presented to the ER from [facility] for evaluation of alcohol withdrawal and was subsequently admitted. Patient was started on prednisolone on 6/23 due to uptrending LFTs and elevated MDF.     Alcohol withdrawal syndrome with perceptual disturbance (Resolved)  Was sent to the emergency department from [facility] [address] [facility] for alcohol withdrawal that was not well controlled there. Drinks about 850 mL vodka daily, last drink on 6/18/23. Has never been through treatment before but has withdrawn, no history of seizures. CIWA stopped 6/25. No gabapentin taper to avoid extra neuro suppressive effects  -Continue multivitamin, folate, thiamine (initially received IV thiamine, now on PO protocol)     Transaminitis  Alcoholic Hepatitis  Fatty Liver  LFTs with alcohol pattern are stable (ALT 44 --> 58,  --> 89), elevated total bilirubin (7.2 --> 4.4), elevated INR (1.52 --> 1.73). Abdominal ultrasound demonstrates \"1.  Mildly enlarged fatty liver. 2.  Presumed tumefactive sludge within the gallbladder lumen. Recommend short-term ultrasound follow-up in 8-12 weeks. No cholelithiasis. 3.  Trace ascites.\" Presumably has alcoholic liver disease (viral Hepatitis Panel negative, Smooth muscle Ab and JESSICA negative, Transferrin 108 (low).  -Prednisolone 40 mg daily started on 6/23 as MDF 45  -Will need recheck labs (albumin, creatinine, PT, bilirubin) on 6/30 to calculate Lille score & assess for continued response to steroids  -Recommend repeat abdominal US in 2-3 months     Generalized weakness  Tremor  Notably weak and requiring moderate assistance on admission, now ambulating independently in room 6/27 but still requiring walker to ambulate in halls. Intentional tremor present likely " due to cerebellar dysfunction due to chronic alcohol use.   -PT/OT consulted, appreciate recs      Normocytic anemia  Thrombocytopenia (Resolved)  Admit platelets 96, improved to WNL (around 250). Iron studies indicate anemia of chronic disease due to alcohol use (low iron (54), low TIBC, (141), normal iron sat (38), transferrin 108).   -Continue to monitor intermittently     Leukocytosis  Mild, most likely due to initiation of prednisolone as developed after starting steroids & patient has no other signs or symptoms of active infection. Monitor intermittently.      Hypokalemia  Hypomagnesemia   Hypophosphatemia   Admit K = 2.7 / Mg = 1.4 / Phos = 1.3. Likely due to poor intake other than alcohol, improving with repletion, diet, & cessation of ETOH while in hospital.     Low-grade fevers  Patient noted to have temperature 100.3 late morning of 6/28  - Swab for COVID     Diet: Combination Diet Regular Diet Adult    DVT Prophylaxis: Pneumatic Compression Devices  Ferro Catheter: Not present  Lines: None     Code Status: Full Code      Disposition: Expected discharge in 1-2 days once he demonstrates ability to safely ambulate.    Hector Poole MD    Interval History   Per nursing report, patient was ambulating yesterday when he fell, landing on his right knee.  Today, he complains of pain in that knee but is able to bend.  Overall, patient is feeling at decreased level of energy.    -Data reviewed today: I reviewed all new labs and imaging results over the last 24 hours. I personally reviewed no images or EKG's today.    Physical Exam   Temp: 100.3  F (37.9  C) Temp src: Oral BP: 103/65 Pulse: 82   Resp: 16 SpO2: 97 % O2 Device: None (Room air)    Vitals:    06/26/23 0403 06/27/23 0546 06/28/23 0601   Weight: 69.5 kg (153 lb 3.5 oz) 71.9 kg (158 lb 8.2 oz) 70.7 kg (155 lb 13.8 oz)     Vital Signs with Ranges  Temp:  [98  F (36.7  C)-100.3  F (37.9  C)] 100.3  F (37.9  C)  Pulse:  [60-92] 82  Resp:  [16-18]  16  BP: (103-128)/() 103/65  SpO2:  [96 %-99 %] 97 %  No intake/output data recorded.    Gen: Jaundiced, thin, debilitated male with decreased muscle mass, and no acute distressed  HEENT: Atraumatic, bitemporal wasting; sclera non-injected, icterric; oral mucosa moist, no lesion, no exudate  Lungs: Clear to ausculation, no wheezes, no rhonchi, no rales  Heart: Regular rate, regular rhythm, no gallops, no rubs, no murmurs  GI: Bowel sound normal, no hepatosplenomegaly, no masses, non-tender, mildly distended, no guarding, no rebound tenderness  Lymph: No lymphadenopathy, 1+ BLE edema  Skin: No rashes, no chronic venous stasis     Medications       enoxaparin ANTICOAGULANT  40 mg Subcutaneous Q24H     folic acid  1 mg Oral Daily     multivitamin w/minerals  1 tablet Oral Daily     prednisoLONE  40 mg Oral Daily     sodium chloride (PF)  3 mL Intracatheter Q8H     thiamine  100 mg Oral Daily       Data   Recent Labs   Lab 06/28/23  0525 06/27/23  0521 06/26/23  0552 06/25/23  0531 06/24/23  0523 06/23/23  1144   WBC  --  12.2* 11.1* 10.6 8.1  --    HGB  --  11.5* 12.4* 11.5* 11.4*  --    MCV  --  97 99 97 99  --     254 265 218 177  --    INR  --   --   --   --  1.73* 1.78*   NA  --  138 136 134* 132*  --    POTASSIUM 3.7 3.7 4.1 3.6 4.2 3.9   CHLORIDE  --  99 97* 97* 96*  --    CO2  --  31* 32* 31* 29  --    BUN  --  8.9 8.6 8.7 9.9  --    CR  --  0.66* 0.60* 0.60* 0.52*  --    ANIONGAP  --  8 7 6* 7  --    MAYNOR  --  8.7 8.9 8.8 8.7  --    GLC  --  85 79 91 138*  --    ALBUMIN  --  3.2* 3.3* 2.9* 3.1*  --    PROTTOTAL  --  5.3* 5.8* 5.3* 5.4*  --    BILITOTAL  --  4.4* 5.7* 6.6* 9.7*  --    ALKPHOS  --  198* 230* 235* 226*  --    ALT  --  58 65 58 59  --    AST  --  89* 110* 120* 161*  --        No results found for this or any previous visit (from the past 24 hour(s)).

## 2023-06-28 NOTE — PROGRESS NOTES
Alert and oriented patient using call light appropriately.  Denies pain.  Ambulated in hallway >400 with gait belt and stand by assist.

## 2023-06-29 ENCOUNTER — APPOINTMENT (OUTPATIENT)
Dept: PHYSICAL THERAPY | Facility: CLINIC | Age: 48
End: 2023-06-29
Payer: COMMERCIAL

## 2023-06-29 VITALS
BODY MASS INDEX: 21.11 KG/M2 | SYSTOLIC BLOOD PRESSURE: 116 MMHG | RESPIRATION RATE: 18 BRPM | OXYGEN SATURATION: 97 % | HEART RATE: 71 BPM | TEMPERATURE: 98.6 F | HEIGHT: 72 IN | WEIGHT: 155.87 LBS | DIASTOLIC BLOOD PRESSURE: 78 MMHG

## 2023-06-29 LAB
ALBUMIN SERPL BCG-MCNC: 2.9 G/DL (ref 3.5–5.2)
ALP SERPL-CCNC: 197 U/L (ref 40–129)
ALT SERPL W P-5'-P-CCNC: 57 U/L (ref 0–70)
ANION GAP SERPL CALCULATED.3IONS-SCNC: 10 MMOL/L (ref 7–15)
AST SERPL W P-5'-P-CCNC: 70 U/L (ref 0–45)
BILIRUB SERPL-MCNC: 3.4 MG/DL
BUN SERPL-MCNC: 7.8 MG/DL (ref 6–20)
CALCIUM SERPL-MCNC: 8.6 MG/DL (ref 8.6–10)
CHLORIDE SERPL-SCNC: 101 MMOL/L (ref 98–107)
CREAT SERPL-MCNC: 0.67 MG/DL (ref 0.67–1.17)
DEPRECATED HCO3 PLAS-SCNC: 29 MMOL/L (ref 22–29)
ERYTHROCYTE [DISTWIDTH] IN BLOOD BY AUTOMATED COUNT: 15.8 % (ref 10–15)
GFR SERPL CREATININE-BSD FRML MDRD: >90 ML/MIN/1.73M2
GLUCOSE SERPL-MCNC: 106 MG/DL (ref 70–99)
HCT VFR BLD AUTO: 34 % (ref 40–53)
HGB BLD-MCNC: 11.4 G/DL (ref 13.3–17.7)
MAGNESIUM SERPL-MCNC: 1.7 MG/DL (ref 1.7–2.3)
MCH RBC QN AUTO: 33.5 PG (ref 26.5–33)
MCHC RBC AUTO-ENTMCNC: 33.5 G/DL (ref 31.5–36.5)
MCV RBC AUTO: 100 FL (ref 78–100)
PHOSPHATE SERPL-MCNC: 3.2 MG/DL (ref 2.5–4.5)
PLATELET # BLD AUTO: 288 10E3/UL (ref 150–450)
POTASSIUM SERPL-SCNC: 3.3 MMOL/L (ref 3.4–5.3)
PROT SERPL-MCNC: 5.2 G/DL (ref 6.4–8.3)
RBC # BLD AUTO: 3.4 10E6/UL (ref 4.4–5.9)
SODIUM SERPL-SCNC: 140 MMOL/L (ref 136–145)
WBC # BLD AUTO: 11.3 10E3/UL (ref 4–11)

## 2023-06-29 PROCEDURE — 97116 GAIT TRAINING THERAPY: CPT | Mod: GP | Performed by: PHYSICAL THERAPIST

## 2023-06-29 PROCEDURE — 83735 ASSAY OF MAGNESIUM: CPT | Performed by: INTERNAL MEDICINE

## 2023-06-29 PROCEDURE — 250N000013 HC RX MED GY IP 250 OP 250 PS 637: Performed by: PHYSICIAN ASSISTANT

## 2023-06-29 PROCEDURE — 80053 COMPREHEN METABOLIC PANEL: CPT | Performed by: INTERNAL MEDICINE

## 2023-06-29 PROCEDURE — 84100 ASSAY OF PHOSPHORUS: CPT | Performed by: INTERNAL MEDICINE

## 2023-06-29 PROCEDURE — 250N000013 HC RX MED GY IP 250 OP 250 PS 637: Performed by: HOSPITALIST

## 2023-06-29 PROCEDURE — 85027 COMPLETE CBC AUTOMATED: CPT | Performed by: INTERNAL MEDICINE

## 2023-06-29 PROCEDURE — 250N000012 HC RX MED GY IP 250 OP 636 PS 637: Performed by: HOSPITALIST

## 2023-06-29 PROCEDURE — 99239 HOSP IP/OBS DSCHRG MGMT >30: CPT | Performed by: INTERNAL MEDICINE

## 2023-06-29 PROCEDURE — 36415 COLL VENOUS BLD VENIPUNCTURE: CPT | Performed by: INTERNAL MEDICINE

## 2023-06-29 PROCEDURE — 250N000013 HC RX MED GY IP 250 OP 250 PS 637: Performed by: INTERNAL MEDICINE

## 2023-06-29 RX ORDER — PREDNISONE 20 MG/1
40 TABLET ORAL DAILY
Qty: 42 TABLET | Refills: 0 | Status: SHIPPED | OUTPATIENT
Start: 2023-06-29 | End: 2023-07-20

## 2023-06-29 RX ORDER — POTASSIUM CHLORIDE 1500 MG/1
40 TABLET, EXTENDED RELEASE ORAL ONCE
Status: COMPLETED | OUTPATIENT
Start: 2023-06-29 | End: 2023-06-29

## 2023-06-29 RX ADMIN — POTASSIUM CHLORIDE 40 MEQ: 1500 TABLET, EXTENDED RELEASE ORAL at 08:52

## 2023-06-29 RX ADMIN — PREDNISOLONE ORAL 40 MG: 15 SOLUTION ORAL at 08:52

## 2023-06-29 RX ADMIN — THIAMINE HCL TAB 100 MG 100 MG: 100 TAB at 08:52

## 2023-06-29 RX ADMIN — MULTIPLE VITAMINS W/ MINERALS TAB 1 TABLET: TAB at 08:52

## 2023-06-29 RX ADMIN — FOLIC ACID 1 MG: 1 TABLET ORAL at 08:52

## 2023-06-29 ASSESSMENT — ACTIVITIES OF DAILY LIVING (ADL)
ADLS_ACUITY_SCORE: 29
ADLS_ACUITY_SCORE: 33
ADLS_ACUITY_SCORE: 32

## 2023-06-29 NOTE — PROGRESS NOTES
"Entered room due to bed alarming. Pt was noted just entering the bathroom and he stated, \"I'm fine. I can walk by myself.\" and closed the door on this writer, explaining \"Im going to pee!\". Waited for pt to finish and stood-by while he walked to the sink and then returned to bed. He reported, \"I want that off!\" as I was turning on the bed alarm. Explained that for safety we want to walk with him and that he had a fall two days ago, so we will assist to repeat a fall. He responded, \"I will be walking to the bathroom\". Bed alarm on.   "

## 2023-06-29 NOTE — PLAN OF CARE
Patient unable to wait for staff to enter room before getting up to use the bathroom. He occasionally will put the call light on but will then immediately get up and walks to the bathroom. Unsteady gait but refuses gait belt or walker. No complaints of gas discomfort. Afebrile tonight.     BP 97/62 (BP Location: Left arm)   Pulse 57   Temp 98.5  F (36.9  C) (Oral)   Resp 16   Ht 1.829 m (6')   Wt 70.7 kg (155 lb 13.8 oz)   SpO2 97%   BMI 21.14 kg/m

## 2023-06-29 NOTE — DISCHARGE SUMMARY
[facility]  Hospitalist Discharge Summary       Date of Admission:  6/21/2023  Date of Discharge:  6/29/2023  1:40 PM  Discharging Provider: Hector Poole MD      Discharge Diagnoses     Alcohol withdrawal syndrome with perceptual disturbance (Resolved)  Transaminitis  Alcoholic Hepatitis  Fatty Liver  Generalized weakness  Tremor  Normocytic anemia  Thrombocytopenia (Resolved)  Leukocytosis  Hypokalemia  Hypomagnesemia   Hypophosphatemia   Low-grade fevers, resolved    Follow-ups Needed After Discharge   Follow-up Appointments     Follow-up and recommended labs and tests       Follow up with primary care provider, No primary care provider on file.,   within 7 days for hospital follow- up.  The following labs/tests are   recommended: CBC and CMP.            Discharge Disposition   Discharged to [facility]  Condition at discharge: [facility]   48 year old male with history of heavy alcohol use admitted on 6/21/2023. He presented to the ER from [facility] for evaluation of alcohol withdrawal and was subsequently admitted. Patient was started on prednisolone on 6/23 due to uptrending LFTs and elevated MDF.     Alcohol withdrawal syndrome with perceptual disturbance (Resolved)  Was sent to the emergency department from [facility] for alcohol withdrawal that was not well controlled there. Drinks about 850 mL vodka daily, last drink on 6/18/23. Has never been through treatment before but has withdrawn, no history of seizures. CIWA stopped 6/25. No gabapentin taper to avoid extra neuro suppressive effects  -Continue multivitamin, folate, thiamine (initially received IV thiamine, now on PO protocol) during hospitalization per CIWA protocol  -Discharged on thiamine     Transaminitis  Alcoholic Hepatitis  Fatty Liver  LFTs with alcohol pattern are stable (ALT 44 --> 58,  --> 89), elevated total bilirubin (7.2 --> 4.4), elevated INR (1.52 -->  "1.73). Abdominal ultrasound demonstrates \"1.  Mildly enlarged fatty liver. 2.  Presumed tumefactive sludge within the gallbladder lumen. Recommend short-term ultrasound follow-up in 8-12 weeks. No cholelithiasis. 3.  Trace ascites.\" Presumably has alcoholic liver disease (viral Hepatitis Panel negative, Smooth muscle Ab and JESSICA negative, Transferrin 108 (low).  -Prednisolone 40 mg daily started on 6/23 as MDF 45  -Will need recheck labs (albumin, creatinine, PT, bilirubin) on 6/30 to calculate Lille score & assess for continued response to steroids  -Recommend repeat abdominal US in 2-3 months  -Prednisone 40 mg daily x 21 additional days     Generalized weakness  Tremor  Notably weak and requiring moderate assistance on admission, now ambulating independently in room 6/27 but still requiring walker to ambulate in halls. Intentional tremor present likely due to cerebellar dysfunction due to chronic alcohol use.   -PT/OT consulted, patient is safe to return to [place] as of 6/29     Normocytic anemia  Thrombocytopenia (Resolved)  Admit platelets 96, improved to WNL (around 250). Iron studies indicate anemia of chronic disease due to alcohol use (low iron (54), low TIBC, (141), normal iron sat (38), transferrin 108).   -Hemoglobin and platelets stable at time of discharge  -Continue to monitor intermittently     Leukocytosis  Mild, most likely due to initiation of prednisolone as developed after starting steroids & patient has no other signs or symptoms of active infection. Monitor intermittently.      Hypokalemia  Hypomagnesemia   Hypophosphatemia   Admit K = 2.7 / Mg = 1.4 / Phos = 1.3. Likely due to poor intake other than alcohol, improving with repletion, diet, & cessation of ETOH while in hospital.   -Resolved at time of discharge    Low-grade fevers, resolved  Patient noted to have temperature 100.3 late morning of 6/28  - Swab for COVID, negative  - No recurrence on day of discharge, patient reports feeling " improved    Consultations This Hospital Stay   CARE MANAGEMENT / SOCIAL WORK IP CONSULT  PHYSICAL THERAPY ADULT IP CONSULT  OCCUPATIONAL THERAPY ADULT IP CONSULT    Code Status   Full Code    Time Spent on this Encounter   I, Hector Poole MD, personally saw the patient today and spent greater than 30 minutes discharging this patient.       Hector Poole MD  [facility]  ______________________________________________________________________    Physical Exam   Vital Signs: Temp: 98.6  F (37  C) Temp src: Oral BP: 116/78 Pulse: 71   Resp: 18 SpO2: 97 % O2 Device: None (Room air)    Weight: 155 lbs 13.84 oz       Gen: Jaundiced, thin, debilitated male with decreased muscle mass, and no acute distressed  HEENT: Atraumatic, bitemporal wasting; sclera non-injected, icterric; oral mucosa moist, no lesion, no exudate  Lungs: Clear to ausculation, no wheezes, no rhonchi, no rales  Heart: Regular rate, regular rhythm, no gallops, no rubs, no murmurs  GI: Bowel sound normal, no hepatosplenomegaly, no masses, non-tender, mildly distended, no guarding, no rebound tenderness  Lymph: No lymphadenopathy, 1+ BLE edema  Skin: No rashes, no chronic venous stasis     Primary Care Physician   No primary care provider on file.    Discharge Orders      Reason for your hospital stay    This is a 48 year old male admitted with severe alcohol withdrawal.     Follow-up and recommended labs and tests     Follow up with primary care provider, No primary care provider on file., within 7 days for hospital follow- up.  The following labs/tests are recommended: CBC and CMP.     Activity    Your activity upon discharge: activity as tolerated     Full Code     Diet    Follow this diet upon discharge: Orders Placed This Encounter      Combination Diet Regular Diet Adult         Significant Results and Procedures   Most Recent 3 CBC's:Recent Labs   Lab Test 06/29/23  0446 06/28/23  0525 06/27/23  0521  06/26/23  0552   WBC 11.3*  --  12.2* 11.1*   HGB 11.4*  --  11.5* 12.4*     --  97 99    268 254 265     Most Recent 3 BMP's:Recent Labs   Lab Test 06/29/23  0446 06/28/23  0525 06/27/23  0521 06/26/23  0552     --  138 136   POTASSIUM 3.3* 3.7 3.7 4.1   CHLORIDE 101  --  99 97*   CO2 29  --  31* 32*   BUN 7.8  --  8.9 8.6   CR 0.67  --  0.66* 0.60*   ANIONGAP 10  --  8 7   MAYNOR 8.6  --  8.7 8.9   *  --  85 79     Most Recent 2 LFT's:Recent Labs   Lab Test 06/29/23  0446 06/27/23  0521   AST 70* 89*   ALT 57 58   ALKPHOS 197* 198*   BILITOTAL 3.4* 4.4*     Most Recent 3 INR's:Recent Labs   Lab Test 06/24/23  0523 06/23/23  1144 06/21/23  1256   INR 1.73* 1.78* 1.52*   ,   Results for orders placed or performed during the hospital encounter of 06/21/23   CT Head w/o Contrast    Narrative    CT SCAN OF THE HEAD WITHOUT CONTRAST   6/21/2023 11:31 AM     HISTORY: Altered mental status, unsteady gait.    TECHNIQUE:  Axial images of the head and coronal reformations without  IV contrast material. Radiation dose for this scan was reduced using  automated exposure control, adjustment of the mA and/or kV according  to patient size, or iterative reconstruction technique.    COMPARISON: None.    FINDINGS: No acute intracranial hemorrhage. No mass effect or midline  shift. Ventricular size is within normal limits without evidence of  hydrocephalus. Probable arachnoid cyst in the anterior left middle  cranial fossa. Gray-white matter differentiation appears intact.    Mucosal thickening in the left maxillary sinus which has an air-fluid  layer. The bony calvarium and bones of the skull base appear intact.       Impression    IMPRESSION:     1. No evidence of acute intracranial hemorrhage, mass, or herniation.  2. Mucosal thickening in the left maxillary sinus which contains an  air-fluid layer. This could represent acute sinusitis in the  appropriate clinical setting.      KENISHA BRIAN MD          SYSTEM ID:  [id number]   Abdomen US, limited (RUQ only)    Narrative    US ABDOMEN LIMITED 6/21/2023 12:58 PM    CLINICAL HISTORY: liver failure, liver failure, etoh abuse  TECHNIQUE: Limited abdominal ultrasound.    COMPARISON: None.    FINDINGS:    GALLBLADDER: There is a 2.1 x 5.4 cm lobulated solid structure in the  dependent portion of the gallbladder lumen. No posterior acoustic  shadowing. No shadowing gallstones. No wall thickening or edema.  Negative sonographic Davidson's sign.    BILE DUCTS: There is no biliary dilatation. The common duct measures 3  mm.    LIVER: Diffusely coarse in echotexture. Mildly enlarged. Normal  surface contour. No discrete focal lesion.    RIGHT KIDNEY: No hydronephrosis.    PANCREAS: The visualized portions of the pancreas are normal.    Trace ascites.      Impression    IMPRESSION:  1.  Mildly enlarged fatty liver.  2.  Presumed tumefactive sludge within the gallbladder lumen.  Recommend short-term ultrasound follow-up in 8-12 weeks. No  cholelithiasis.  3.  Trace ascites.    FLACA ORTIZ MD         SYSTEM ID:  [id number]       Discharge Medications   Current Discharge Medication List      START taking these medications    Details   predniSONE (DELTASONE) 20 MG tablet Take 2 tablets (40 mg) by mouth daily for 21 days  Qty: 42 tablet, Refills: 0    Associated Diagnoses: Alcoholic hepatitis without ascites         CONTINUE these medications which have NOT CHANGED    Details   loperamide (IMODIUM A-D) 2 MG tablet Take 2 mg by mouth 4 times daily as needed for diarrhea      LORazepam (ATIVAN) 2 MG tablet Take 2 mg by mouth Per [facility]      magnesium chloride 535 (64 Mg) MG TBEC CR tablet Take 535 mg by mouth Per [facility]      Thiamine HCl (THIAMINE PO) Take 1,000 mg by mouth Per [facility]         STOP taking these medications       ibuprofen (ADVIL/MOTRIN) 200 MG tablet Comments:   Reason for Stopping:             Allergies   No Known Allergies

## 2023-06-29 NOTE — PLAN OF CARE
Patient is very frustrated again today over his inability to be independent with ambulation in his room. He is unsteady and starts to lose his balance. Gets upset if staff have to reach out to steady him. Used the bathroom and then crawled into bed from the bottom of the bed on all fours. Is not demonstrating safe mobility. Awaiting PT to assess. Brenda calls daily for an update and seeking his abililty to be independent, but has been unable and he did have a fall on Tuesday. A staff person had brought him an actual razor to shave, but he is still tremulous and on lovenox. Told him if he wanted to shave that staff would have to do it. He was upset and said that he just wouldn't shave.  Potassium 3.3, so replaced per protocol. Afebrile, vitals are stable. Continue to acknowledge his feelings of frustration for losing his independence, but staff still have to keep him safe from injury.

## 2023-06-29 NOTE — PROGRESS NOTES
[facility] DISCHARGE NOTE    Patient discharged to rehabilitation facility at 1:38 PM via wheel chair. Accompanied by other:jannaen . Discharge instructions reviewed with patient, opportunity offered to ask questions. Prescriptions sent with patient to fill . All belongings sent with patient.    Nicolasa Zuñiga RN

## 2023-06-29 NOTE — PROGRESS NOTES
Patient ambulated with Assist X1 GB in the hallways. When staff went in the room later patient was standing and unsteady on feet, tremoring as they stood. This staff told the patient that they need to have staff buy when up out of bed. Patient stated that they have to go to the bathroom multiple time a night and could not wait for staff. This staff gave the patient a urinal, told patient to place call light on and if a staff member was not able to get them in time to use the urinal and staff would empty it. The patient seemed annoyed but stated they would. Bed alarm was set.

## 2024-04-10 PROCEDURE — 87507 IADNA-DNA/RNA PROBE TQ 12-25: CPT | Performed by: CLINICAL MEDICAL LABORATORY

## 2024-04-10 PROCEDURE — PSEU9037 GASTROINTESTINAL PATHOGEN PANEL: Performed by: CLINICAL MEDICAL LABORATORY

## 2024-04-11 ENCOUNTER — LAB REQUISITION (OUTPATIENT)
Dept: LAB | Age: 49
End: 2024-04-11

## 2024-04-11 DIAGNOSIS — F10.11 ALCOHOL ABUSE, IN REMISSION: ICD-10-CM

## 2024-04-11 DIAGNOSIS — D62 ACUTE POSTHEMORRHAGIC ANEMIA: ICD-10-CM

## 2024-04-11 DIAGNOSIS — K92.2 GASTROINTESTINAL HEMORRHAGE, UNSPECIFIED: ICD-10-CM

## 2024-04-11 DIAGNOSIS — E83.42 HYPOMAGNESEMIA: ICD-10-CM

## 2024-04-11 PROCEDURE — PSEU8171 VITAMIN B12 AND FOLATE: Performed by: CLINICAL MEDICAL LABORATORY

## 2024-04-11 PROCEDURE — 82746 ASSAY OF FOLIC ACID SERUM: CPT | Performed by: CLINICAL MEDICAL LABORATORY

## 2024-04-11 PROCEDURE — 82607 VITAMIN B-12: CPT | Performed by: CLINICAL MEDICAL LABORATORY

## 2024-04-12 LAB
FOLATE SERPL-MCNC: >24 NG/ML
VIT B12 SERPL-MCNC: 1917 PG/ML (ref 211–911)

## 2024-04-14 LAB — IGA SERPL-MCNC: 258 MG/DL (ref 70–400)

## 2024-04-14 PROCEDURE — 86677 HELICOBACTER PYLORI ANTIBODY: CPT | Performed by: CLINICAL MEDICAL LABORATORY

## 2024-04-14 PROCEDURE — PSEU8537 IMMUNOGLOBULIN IGA QUANTITATIVE: Performed by: CLINICAL MEDICAL LABORATORY

## 2024-04-14 PROCEDURE — PSEU8557 TISSUE TRANSGLUTAMINASE ANTIBODIES IGA: Performed by: CLINICAL MEDICAL LABORATORY

## 2024-04-14 PROCEDURE — 86364 TISS TRNSGLTMNASE EA IG CLAS: CPT | Performed by: CLINICAL MEDICAL LABORATORY

## 2024-04-14 PROCEDURE — PSEU8565 H PYLORI ANTIBODY IGG SCREEN: Performed by: CLINICAL MEDICAL LABORATORY

## 2024-04-14 PROCEDURE — 82784 ASSAY IGA/IGD/IGG/IGM EACH: CPT | Performed by: CLINICAL MEDICAL LABORATORY

## 2024-04-16 LAB — TTG IGA SER IA-ACNC: 1 U/ML

## 2024-04-17 LAB — H PYLORI IGG SERPL QL IA: NEGATIVE

## 2024-05-21 ENCOUNTER — LAB REQUISITION (OUTPATIENT)
Dept: LAB | Age: 49
End: 2024-05-21

## 2024-05-21 DIAGNOSIS — M25.473 EFFUSION, UNSPECIFIED ANKLE: ICD-10-CM

## 2024-05-21 DIAGNOSIS — M25.449 EFFUSION, UNSPECIFIED HAND: ICD-10-CM

## 2024-05-21 DIAGNOSIS — M25.50 PAIN IN UNSPECIFIED JOINT: ICD-10-CM

## 2024-05-21 PROCEDURE — PSEU8550 RHEUMATOID FACTOR: Performed by: CLINICAL MEDICAL LABORATORY

## 2024-05-21 PROCEDURE — 86038 ANTINUCLEAR ANTIBODIES: CPT | Performed by: CLINICAL MEDICAL LABORATORY

## 2024-05-21 PROCEDURE — 86431 RHEUMATOID FACTOR QUANT: CPT | Performed by: CLINICAL MEDICAL LABORATORY

## 2024-05-21 PROCEDURE — PSEU8499 ANA SCREEN WITH ANTIBODY AND IFA REFLEX: Performed by: CLINICAL MEDICAL LABORATORY

## 2024-05-21 PROCEDURE — PSEU8273 LYME IGG AND IGM ANTIBODY SCREEN WITH REFLEX TO IMMUNOBLOT: Performed by: CLINICAL MEDICAL LABORATORY

## 2024-05-21 PROCEDURE — 86618 LYME DISEASE ANTIBODY: CPT | Performed by: CLINICAL MEDICAL LABORATORY

## 2024-05-22 LAB
ANA SER QL IA: NEGATIVE
B BURGDOR IGG+IGM SER QL IA: NEGATIVE
RHEUMATOID FACT SER NEPH-ACNC: <10 UNITS/ML

## 2025-01-08 ENCOUNTER — LAB REQUISITION (OUTPATIENT)
Dept: LAB | Age: 50
End: 2025-01-08

## 2025-01-08 DIAGNOSIS — E55.9 VITAMIN D DEFICIENCY, UNSPECIFIED: ICD-10-CM

## 2025-01-08 DIAGNOSIS — Z51.81 ENCOUNTER FOR THERAPEUTIC DRUG LEVEL MONITORING: ICD-10-CM

## 2025-01-08 PROCEDURE — PSEU8306 VITAMIN B12: Performed by: CLINICAL MEDICAL LABORATORY

## 2025-01-08 PROCEDURE — 82607 VITAMIN B-12: CPT | Performed by: CLINICAL MEDICAL LABORATORY

## 2025-01-08 PROCEDURE — PSEU8229 VITAMIN D -25 HYDROXY: Performed by: CLINICAL MEDICAL LABORATORY

## 2025-01-08 PROCEDURE — 82306 VITAMIN D 25 HYDROXY: CPT | Performed by: CLINICAL MEDICAL LABORATORY

## 2025-01-09 LAB
25(OH)D3+25(OH)D2 SERPL-MCNC: 25.8 NG/ML (ref 30–100)
VIT B12 SERPL-MCNC: >2000 PG/ML (ref 211–911)

## 2025-06-03 PROCEDURE — 84165 PROTEIN E-PHORESIS SERUM: CPT | Performed by: CLINICAL MEDICAL LABORATORY

## 2025-06-03 PROCEDURE — 84155 ASSAY OF PROTEIN SERUM: CPT | Performed by: CLINICAL MEDICAL LABORATORY

## 2025-06-03 PROCEDURE — PSEU9048 PROTEIN ELECTROPHORESIS: Performed by: CLINICAL MEDICAL LABORATORY

## 2025-06-04 ENCOUNTER — LAB REQUISITION (OUTPATIENT)
Dept: LAB | Age: 50
End: 2025-06-04

## 2025-06-04 DIAGNOSIS — R20.2 PARESTHESIA OF SKIN: ICD-10-CM

## 2025-06-05 LAB
ALBUMIN SERPL ELPH-MCNC: 4.5 G/DL (ref 3.5–4.9)
ALPHA1 GLOB MFR SERPL ELPH: 0.2 G/DL (ref 0.2–0.4)
ALPHA2 GLOB MFR SERPL ELPH: 0.5 G/DL (ref 0.5–0.9)
B-GLOBULIN SERPL ELPH-MCNC: 0.8 G/DL (ref 0.7–1.2)
GAMMA GLOB SERPL ELPH-MCNC: 0.7 G/DL (ref 0.7–1.7)
GLOBULIN SER-MCNC: 2.2 G/DL (ref 2.1–4.2)
PATH INTERP SPEC-IMP: NORMAL
PROT SERPL-MCNC: 6.7 G/DL (ref 6.4–8.2)